# Patient Record
Sex: FEMALE | Race: WHITE | Employment: FULL TIME | URBAN - METROPOLITAN AREA
[De-identification: names, ages, dates, MRNs, and addresses within clinical notes are randomized per-mention and may not be internally consistent; named-entity substitution may affect disease eponyms.]

---

## 2020-08-11 ENCOUNTER — APPOINTMENT (OUTPATIENT)
Dept: GENERAL RADIOLOGY | Age: 55
End: 2020-08-11

## 2020-08-11 ENCOUNTER — HOSPITAL ENCOUNTER (EMERGENCY)
Age: 55
Discharge: HOME | End: 2020-08-11
Attending: EMERGENCY MEDICINE

## 2020-08-11 VITALS
HEIGHT: 68 IN | BODY MASS INDEX: 33.01 KG/M2 | RESPIRATION RATE: 18 BRPM | WEIGHT: 217.8 LBS | HEART RATE: 99 BPM | OXYGEN SATURATION: 96 % | SYSTOLIC BLOOD PRESSURE: 152 MMHG | TEMPERATURE: 98.1 F | DIASTOLIC BLOOD PRESSURE: 100 MMHG

## 2020-08-11 LAB — D DIMER: <200 NG/ML DDU (ref 0–229)

## 2020-08-11 PROCEDURE — 96372 THER/PROPH/DIAG INJ SC/IM: CPT

## 2020-08-11 PROCEDURE — 6360000002 HC RX W HCPCS: Performed by: EMERGENCY MEDICINE

## 2020-08-11 PROCEDURE — 85379 FIBRIN DEGRADATION QUANT: CPT

## 2020-08-11 PROCEDURE — 73562 X-RAY EXAM OF KNEE 3: CPT

## 2020-08-11 PROCEDURE — 99283 EMERGENCY DEPT VISIT LOW MDM: CPT

## 2020-08-11 RX ORDER — IBUPROFEN 200 MG
200 TABLET ORAL EVERY 6 HOURS PRN
COMMUNITY

## 2020-08-11 RX ORDER — CYCLOBENZAPRINE HCL 5 MG
5 TABLET ORAL 3 TIMES DAILY PRN
COMMUNITY

## 2020-08-11 RX ORDER — OXYCODONE HYDROCHLORIDE 15 MG/1
15 TABLET ORAL EVERY 4 HOURS PRN
COMMUNITY

## 2020-08-11 RX ORDER — KETOROLAC TROMETHAMINE 30 MG/ML
30 INJECTION, SOLUTION INTRAMUSCULAR; INTRAVENOUS ONCE
Status: COMPLETED | OUTPATIENT
Start: 2020-08-11 | End: 2020-08-11

## 2020-08-11 RX ORDER — VENLAFAXINE HYDROCHLORIDE 150 MG/1
150 TABLET, EXTENDED RELEASE ORAL
COMMUNITY

## 2020-08-11 RX ORDER — ASPIRIN 81 MG
1 TABLET,CHEWABLE ORAL
Qty: 56.6 G | Refills: 0 | Status: SHIPPED | OUTPATIENT
Start: 2020-08-11

## 2020-08-11 RX ORDER — METOPROLOL SUCCINATE 50 MG/1
50 TABLET, EXTENDED RELEASE ORAL DAILY
COMMUNITY

## 2020-08-11 RX ORDER — AMLODIPINE BESYLATE 10 MG/1
10 TABLET ORAL DAILY
COMMUNITY

## 2020-08-11 RX ADMIN — KETOROLAC TROMETHAMINE 30 MG: 30 INJECTION, SOLUTION INTRAMUSCULAR at 19:36

## 2020-08-11 SDOH — HEALTH STABILITY: MENTAL HEALTH: HOW OFTEN DO YOU HAVE A DRINK CONTAINING ALCOHOL?: NEVER

## 2020-08-11 ASSESSMENT — PAIN DESCRIPTION - PAIN TYPE: TYPE: ACUTE PAIN

## 2020-08-11 ASSESSMENT — PAIN SCALES - GENERAL
PAINLEVEL_OUTOF10: 2
PAINLEVEL_OUTOF10: 10

## 2020-08-11 ASSESSMENT — PAIN DESCRIPTION - LOCATION: LOCATION: LEG

## 2020-08-11 ASSESSMENT — ENCOUNTER SYMPTOMS: SHORTNESS OF BREATH: 0

## 2020-08-11 ASSESSMENT — PAIN DESCRIPTION - ORIENTATION: ORIENTATION: RIGHT

## 2020-08-11 NOTE — ED NOTES
Pt dc/d with instructions and rx in stable condition, ambulatory to [facility], home per ride.       Franchesca Lee RN  08/11/20 8583

## 2020-08-11 NOTE — ED PROVIDER NOTES
[address] [facility]  EMERGENCYDEPARTMENT ENCOUNTER      Pt Name: Emily Griffith  MRN: [id number]  [place] 1965  Date of evaluation: 8/11/2020  Thelma Velasquez MD    [address] [place] [address]       Chief Complaint   Patient presents with    Leg Pain         HISTORY OF PRESENT ILLNESS   (Location/Symptom, Timing/Onset,Context/Setting, Quality, Duration, Modifying Factors, Severity)  Note limiting factors. Emily Griffith is a 54 y.o. female who presents to the emergency department for right leg pain x 2 weeks. Scribes sharp shooting pains that start from the thigh going down all the way down to the foot. 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 Denies falls, trauma, injuries that she's aware of. States she had similar episode (unknown which side), and had a 'hot cream' and two days later pain resolved. And yesterday was walking up the steps and felt her knee 'twist and heard a pop' and since then has been more difficult to bear weight and ambulate. HPI    Nursing Notes were reviewed. REVIEW OF SYSTEMS    (2-9 systems for level 4, 10 or more for level 5)     Review of Systems   Respiratory: Negative for shortness of breath. Musculoskeletal: Positive for arthralgias, gait problem and joint swelling. Skin: Negative for rash. Neurological: Negative for weakness and numbness. Except as noted above the remainder of the review of systems was reviewedand negative. PAST MEDICAL HISTORY     Past Medical History:   Diagnosis Date    Degenerative disc disease, lumbar     Hyperlipidemia     Hypertension     Spinal stenosis          SURGICAL HISTORY     History reviewed. No pertinent surgical history.       CURRENT MEDICATIONS       Previous Medications    AMLODIPINE (NORVASC) 10 MG TABLET    Take 10 mg by mouth daily ATORVASTATIN CALCIUM PO    Take by mouth    CYCLOBENZAPRINE (FLEXERIL) 5 MG TABLET    Take 5 mg by mouth 3 times daily as needed for Muscle spasms    IBUPROFEN (ADVIL;MOTRIN) 200 MG TABLET    Take 200 mg by mouth every 6 hours as needed for Pain    METOPROLOL SUCCINATE (TOPROL XL) 50 MG EXTENDED RELEASE TABLET    Take 50 mg by mouth daily    OXYCODONE (OXY-IR) 15 MG IMMEDIATE RELEASE TABLET    Take 15 mg by mouth every 4 hours as needed for Pain. VENLAFAXINE 150 MG EXTENDED RELEASE TABLET    Take 150 mg by mouth daily (with breakfast)       ALLERGIES     Patient has no known allergies. FAMILY HISTORY     History reviewed. No pertinent family history.        SOCIAL HISTORY       Social History     Socioeconomic History    Marital status: Single     Spouse name: None    Number of children: None    Years of education: None    Highest education level: None   Occupational History    None   Social Needs    Financial resource strain: None    Food insecurity     Worry: None     Inability: None    Transportation needs     Medical: None     Non-medical: None   Tobacco Use    Smoking status: Current Every Day Smoker     Packs/day: 0.75     Types: Cigarettes    Smokeless tobacco: Never Used   Substance and Sexual Activity    Alcohol use: Never     Frequency: Never    Drug use: None    Sexual activity: None   Lifestyle    Physical activity     Days per week: None     Minutes per session: None    Stress: None   Relationships    Social connections     Talks on phone: None     Gets together: None     Attends Restorationist service: None     Active member of club or organization: None     Attends meetings of clubs or organizations: None     Relationship status: None    Intimate partner violence     Fear of current or ex partner: None     Emotionally abused: None     Physically abused: None     Forced sexual activity: None   Other Topics Concern    None   Social History Narrative    None       SCREENINGS PHYSICAL EXAM    (up to 7 for level 4, 8 ormore for level 5)     ED Triage Vitals [08/11/20 1824]   BP Temp Temp Source Pulse Resp SpO2 Height Weight   (!) 150/101 98.1 °F (36.7 °C) Oral 124 18 97 % 5' 8\" (1.727 m) 217 lb 12.8 oz (98.8 kg)       Physical Exam  Vitals signs and nursing note reviewed. Constitutional:       General: She is not in acute distress. Appearance: She is well-developed. She is not ill-appearing, toxic-appearing or diaphoretic. Comments: Well-appearing, nontoxic, not in acute distress. Answers questions in full sentences and following verbal commands appropriately   HENT:      Head: Normocephalic and atraumatic. Eyes:      General:         Right eye: No discharge. Left eye: No discharge. Conjunctiva/sclera: Conjunctivae normal.   Neck:      Musculoskeletal: Normal range of motion and neck supple. Cardiovascular:      Pulses:           Dorsalis pedis pulses are 2+ on the right side and 2+ on the left side. Pulmonary:      Effort: Pulmonary effort is normal. No respiratory distress. Musculoskeletal:      Right knee: She exhibits decreased range of motion and swelling. Tenderness found. Medial joint line tenderness noted. Comments: Symmetric bilateral lower extremity. +Maycol's sign, tenderness to right calf, without erythema or any other overlying skin changes   Skin:     Coloration: Skin is not pale. Neurological:      Mental Status: She is alert and oriented to person, place, and time. Psychiatric:         Behavior: Behavior normal. Behavior is cooperative.          DIAGNOSTIC RESULTS     EKG: All EKG's are interpreted by the Emergency Department Physicianwho either signs or Co-signs this chart in the absence of a cardiologist.      RADIOLOGY:   Non-plain film images such as CT, Ultrasound and MRI are read by the radiologist. Plain radiographic images are visualized and preliminarily interpreted by the emergency physician with the below findings:      Interpretation per the Radiologist below, if available at the time of this note:    XR KNEE RIGHT (3 VIEWS)    (Results Pending)         ED BEDSIDE ULTRASOUND:   Performed by ED Physician - none    LABS:  Labs Reviewed   D-DIMER, QUANTITATIVE       All other labs were within normal range ornot returned as of this dictation. EMERGENCY DEPARTMENT COURSE and DIFFERENTIAL DIAGNOSIS/MDM:   Vitals:    Vitals:    08/11/20 1824   BP: (!) 150/101   Pulse: 124   Resp: 18   Temp: 98.1 °F (36.7 °C)   TempSrc: Oral   SpO2: 97%   Weight: 217 lb 12.8 oz (98.8 kg)   Height: 5' 8\" (1.727 m)         MDM    ED COURSE/MDM    -Shahriar Yi is a 54 y.o. female with no significant medical history presents ED for right leg pain. Patient states that it started 2 weeks ago described as sharp shooting pains from right upper thigh that radiates down to her toes. Denies falls, traumas, accidents or any other known injury. States she felt like it was starting to improve several days later however pain returned and today was walking down the stairs when she felt her knee twist and heard a pop. Patient states when she is sitting does not cause any pain however when she ambulates or bears weight, reports tenderness to the calf and goes up her mid thigh.  -On evaluation, 2+ DP pulses bilaterally, skin dry and appropriately warm. Bilateral lower extremity symmetric in size. Mild swelling to right knee, posterior and anterior drawer test, Lachman and Ryan, valgus and varus stress all negative.  -Upon handoff pending X-ray of right knee, however there is concern for DVT given complaints of calf tenderness. Likely will need future DVT scan. Disposition as deemed appropriate by Dr. Padilla Barrera.        REASSESSMENT      Well appearing, non toxic, alert, oriented speaking in full sentences and hemodynamically stable upon discharge      CRITICAL CARE TIME   Total Critical Care time was 0 minutes, excluding separately reportableprocedures. There was a high probability of clinicallysignificant/life threatening deterioration in the patient's condition which required my urgent intervention. CONSULTS:  None    PROCEDURES:  Unless otherwise noted below, none     Procedures    FINAL IMPRESSION      1. Calf tenderness    2. Acute pain of right knee          DISPOSITION/PLAN   DISPOSITION        PATIENT REFERREDTO:  No follow-up provider specified.     DISCHARGE MEDICATIONS:  New Prescriptions    No medications on file          (Please note that portions of this note were completed with a voice recognition program.  Efforts were made to edit the dictations but occasionally wordsare mis-transcribed.)    Alicia Jaimes MD (electronically signed)  Attending Emergency Physician            Alicia Jaimes MD  08/11/20 2317

## 2021-11-13 ENCOUNTER — HOSPITAL ENCOUNTER (EMERGENCY)
Age: 56
Discharge: HOME | End: 2021-11-13
Attending: EMERGENCY MEDICINE
Payer: OTHER GOVERNMENT

## 2021-11-13 ENCOUNTER — APPOINTMENT (OUTPATIENT)
Dept: CT IMAGING | Age: 56
End: 2021-11-13
Payer: OTHER GOVERNMENT

## 2021-11-13 VITALS
HEIGHT: 68 IN | HEART RATE: 86 BPM | WEIGHT: 225.2 LBS | BODY MASS INDEX: 34.13 KG/M2 | OXYGEN SATURATION: 98 % | SYSTOLIC BLOOD PRESSURE: 147 MMHG | DIASTOLIC BLOOD PRESSURE: 85 MMHG | TEMPERATURE: 97.9 F | RESPIRATION RATE: 20 BRPM

## 2021-11-13 DIAGNOSIS — Z20.822 ENCOUNTER FOR LABORATORY TESTING FOR COVID-19 VIRUS: ICD-10-CM

## 2021-11-13 DIAGNOSIS — R19.7 DIARRHEA, UNSPECIFIED TYPE: ICD-10-CM

## 2021-11-13 DIAGNOSIS — J06.9 VIRAL URI WITH COUGH: Primary | ICD-10-CM

## 2021-11-13 PROCEDURE — U0003 INFECTIOUS AGENT DETECTION BY NUCLEIC ACID (DNA OR RNA); SEVERE ACUTE RESPIRATORY SYNDROME CORONAVIRUS 2 (SARS-COV-2) (CORONAVIRUS DISEASE [COVID-19]), AMPLIFIED PROBE TECHNIQUE, MAKING USE OF HIGH THROUGHPUT TECHNOLOGIES AS DESCRIBED BY CMS-2020-01-R: HCPCS

## 2021-11-13 PROCEDURE — 99282 EMERGENCY DEPT VISIT SF MDM: CPT

## 2021-11-13 PROCEDURE — U0005 INFEC AGEN DETEC AMPLI PROBE: HCPCS

## 2021-11-13 RX ORDER — BENZONATATE 100 MG/1
100-200 CAPSULE ORAL 3 TIMES DAILY PRN
Qty: 30 CAPSULE | Refills: 1 | Status: SHIPPED | OUTPATIENT
Start: 2021-11-13 | End: 2021-11-20

## 2021-11-13 NOTE — ED TRIAGE NOTES
Loss of taste and smell since Tuesday. Mild cough. Nasal congestion. Had 2 days of diarrhea and lower abdominal cramping beginning Tuesday as well. Has subsided now.

## 2021-11-13 NOTE — ED PROVIDER NOTES
TRIAGE CHIEF COMPLAINT:   Chief Complaint   Patient presents with    Concern For COVID-19         HPI: Vaelria Chau is a 64 y.o. female who presents to the Emergency Department with complaint of onset 2 days ago of diarrhea. Since then she has had decreased sense of taste and smell. Denies fever or chills. She has a slight congested cough. Denies any myalgias. No chest pain or shortness of breath. Denies vomiting. Her diarrhea is better. Patient denies any definite COVID-19 exposure. Her mother has cough and congestion but has recently tested negative for Covid. Patient is requesting COVID-19 testing. She has a history of hypertension and states she is compliant on her medications. REVIEW OF SYSTEMS:  6 systems reviewed. Pertinent positives per HPI. Otherwise noted to be negative. Nursing notes reviewed and agree with above. Past medical/surgical history reviewed. MEDICATIONS   Patient's Medications   New Prescriptions    No medications on file   Previous Medications    AMLODIPINE (NORVASC) 10 MG TABLET    Take 10 mg by mouth daily    ATORVASTATIN CALCIUM PO    Take by mouth    CAPSAICIN ([employer] CAPSAICIN HP) 0.1 % CREA    Apply 1 g topically every 4-6 hours as needed (leg pain)    CYCLOBENZAPRINE (FLEXERIL) 5 MG TABLET    Take 5 mg by mouth 3 times daily as needed for Muscle spasms    IBUPROFEN (ADVIL;MOTRIN) 200 MG TABLET    Take 200 mg by mouth every 6 hours as needed for Pain    METOPROLOL SUCCINATE (TOPROL XL) 50 MG EXTENDED RELEASE TABLET    Take 50 mg by mouth daily    OXYCODONE (OXY-IR) 15 MG IMMEDIATE RELEASE TABLET    Take 15 mg by mouth every 4 hours as needed for Pain.     VENLAFAXINE 150 MG EXTENDED RELEASE TABLET    Take 150 mg by mouth daily (with breakfast)   Modified Medications    No medications on file   Discontinued Medications    No medications on file         ALLERGIES No Known Allergies      BP (!) 147/85   Pulse 86   Temp 97.9 °F (36.6 °C) (Oral)   Resp 20   Ht 5' 8\" (1.727 m)   Wt 225 lb 3.2 oz (102.2 kg)   SpO2 98%   BMI 34.24 kg/m²   General:  No acute distress. Non toxic appearance. Patient has chronic hoarseness of her voice which she states is baseline. Head:   Normocephalic and atraumatic  Eyes:   Conjunctiva clear, NICOLAS, EOM's intact. Sclera anicteric. ENT:   Mucous membranes moist.  TMs are normal.  Nose and oropharynx are clear. Neck:   Supple. No adenopathy or jugular venous distension  Lungs/Chest:  No respiratory distress occasional congested cough noted. No rales heard. CVS:   Regular rate and rhythm  Abdomen:  Nontender  Extremities:  Full range of motion  Skin:   No rashes or lesions to exposed skin  Back:   Nontender  Neuro:  Alert and OX3. Speech clear and appropriate. No upper/lower extremity weakness. Normal sensation in all extremities. No facial asymmetry or weakness. Gait normal.  Psych:   Affect normal. Mood normal        RADIOLOGY:      LAB      ED COURSE / MDM:  51-year-old female with onset 2 days ago of diarrhea followed by decreased sense of taste and smell and slight cough. No abdominal pain or vomiting. No fever or chills. Denies chest pain or shortness of breath. I see no clinical evidence for otitis media, strep throat, acute bacterial sinusitis or pneumonia. COVID-19 testing was sent and results are pending. She was given COVID-19 discharge instructions. I recommended ibuprofen if needed for pain along with increase fluids. She was given a prescription for Tessalon to take if needed for cough. Recommended over-the-counter Imodium if she should needed for diarrhea. I discussed with Chanda Ruiz the results of the evaluation in the Emergency Department, diagnosis, care, prognosis and the importance of follow-up. The patient is stable for discharge. The patient and/or family are in agreement with the plan and all questions have been answered.   Specific discharge instructions were explained, including reasons to

## 2021-11-13 NOTE — Clinical Note
Karina Urbina was seen and treated in our emergency department on 11/13/2021. She may return to work on 11/16/2021. If you have any questions or concerns, please don't hesitate to call.       Milly Christianson MD

## 2021-11-14 LAB — SARS-COV-2: DETECTED

## 2021-11-15 ENCOUNTER — CARE COORDINATION (OUTPATIENT)
Dept: CASE MANAGEMENT | Age: 56
End: 2021-11-15

## 2021-11-15 NOTE — CARE COORDINATION
Attempted to contact patient for ED f/u call  Message states the number you have dialed is no longer in service or the number has been disconnected  No further outreach scheduled with this ACM; episode of care resolved

## 2022-01-20 ENCOUNTER — HOSPITAL ENCOUNTER (EMERGENCY)
Age: 57
Discharge: HOME | End: 2022-01-20
Attending: EMERGENCY MEDICINE

## 2022-01-20 VITALS
TEMPERATURE: 97.4 F | BODY MASS INDEX: 35.06 KG/M2 | HEART RATE: 85 BPM | WEIGHT: 231.31 LBS | HEIGHT: 68 IN | OXYGEN SATURATION: 100 % | RESPIRATION RATE: 16 BRPM

## 2022-01-20 DIAGNOSIS — S61.011A LACERATION OF RIGHT THUMB WITHOUT FOREIGN BODY WITHOUT DAMAGE TO NAIL, INITIAL ENCOUNTER: Primary | ICD-10-CM

## 2022-01-20 PROCEDURE — 90715 TDAP VACCINE 7 YRS/> IM: CPT | Performed by: EMERGENCY MEDICINE

## 2022-01-20 PROCEDURE — 90471 IMMUNIZATION ADMIN: CPT | Performed by: EMERGENCY MEDICINE

## 2022-01-20 PROCEDURE — 12001 RPR S/N/AX/GEN/TRNK 2.5CM/<: CPT

## 2022-01-20 PROCEDURE — 6360000002 HC RX W HCPCS: Performed by: EMERGENCY MEDICINE

## 2022-01-20 PROCEDURE — 99283 EMERGENCY DEPT VISIT LOW MDM: CPT

## 2022-01-20 PROCEDURE — 6370000000 HC RX 637 (ALT 250 FOR IP): Performed by: EMERGENCY MEDICINE

## 2022-01-20 RX ORDER — CEPHALEXIN 500 MG/1
500 CAPSULE ORAL ONCE
Status: COMPLETED | OUTPATIENT
Start: 2022-01-20 | End: 2022-01-20

## 2022-01-20 RX ORDER — CEPHALEXIN 500 MG/1
500 CAPSULE ORAL 2 TIMES DAILY
Qty: 6 CAPSULE | Refills: 0 | Status: SHIPPED | OUTPATIENT
Start: 2022-01-20 | End: 2022-01-23

## 2022-01-20 RX ADMIN — TETANUS TOXOID, REDUCED DIPHTHERIA TOXOID AND ACELLULAR PERTUSSIS VACCINE, ADSORBED 0.5 ML: 5; 2.5; 8; 8; 2.5 SUSPENSION INTRAMUSCULAR at 12:52

## 2022-01-20 RX ADMIN — CEPHALEXIN 500 MG: 500 CAPSULE ORAL at 12:53

## 2022-01-20 ASSESSMENT — ENCOUNTER SYMPTOMS
COUGH: 0
SHORTNESS OF BREATH: 0
WHEEZING: 0
PHOTOPHOBIA: 0
RHINORRHEA: 0
DIARRHEA: 0
ABDOMINAL DISTENTION: 0
NAUSEA: 0
VOMITING: 0
BACK PAIN: 0

## 2022-01-20 NOTE — ED NOTES
Pt states that she was at work using the  and she sliced her thumb on right hand and pt currently soaking hand in saline and hibiclense and denies pain and states that her tetnus is greater than 5 years.       Paris Magallanes RN  01/20/22 5838

## 2022-01-20 NOTE — ED PROVIDER NOTES
Emergency Department Provider Note  Location: [address] [place] [address]  1/20/2022     Patient Identification  Willy Nichols is a 64 y.o. female    Chief Complaint  Laceration (right thumb from )          HPI  (History provided by patient)  Patient is a 49-year-old female who presents for occupational injury laceration to the right thumb pad of the thumb distal phalanx after cutting it on a . She had been cutting meat and sliced her finger. She denies blood thinners or anticoagulants. Unknown last tetanus. Hemostatic at time of arrival.  No other injuries reported. Very mild achy pain no exacerbating relieving factors. No numbness and able to normally range the thumb. I have reviewed the following nursing documentation:  Allergies: No Known Allergies    Past medical history:  has a past medical history of Degenerative disc disease, lumbar, Hyperlipidemia, Hypertension, and Spinal stenosis. Past surgical history:  has no past surgical history on file. Home medications:   Prior to Admission medications    Medication Sig Start Date End Date Taking? Authorizing Provider   cephALEXin (KEFLEX) 500 MG capsule Take 1 capsule by mouth 2 times daily for 3 days 1/20/22 1/23/22 Yes Ivan Rumsey, MD   ibuprofen (ADVIL;MOTRIN) 200 MG tablet Take 200 mg by mouth every 6 hours as needed for Pain    Historical Provider, MD   oxyCODONE (OXY-IR) 15 MG immediate release tablet Take 15 mg by mouth every 4 hours as needed for Pain.     Historical Provider, MD   cyclobenzaprine (FLEXERIL) 5 MG tablet Take 5 mg by mouth 3 times daily as needed for Muscle spasms    Historical Provider, MD   amLODIPine (NORVASC) 10 MG tablet Take 10 mg by mouth daily    Historical Provider, MD   metoprolol succinate (TOPROL XL) 50 MG extended release tablet Take 50 mg by mouth daily    Historical Provider, MD   venlafaxine 150 MG extended release tablet Take 150 mg by mouth daily (with breakfast) Historical Provider, MD   ATORVASTATIN CALCIUM PO Take by mouth    Historical Provider, MD   Capsaicin ([employer]S CAPSAICIN HP) 0.1 % CREA Apply 1 g topically every 4-6 hours as needed (leg pain) 8/11/20   Dao Moss MD       Social history:  reports that she has been smoking cigarettes. She has been smoking about 0.75 packs per day. She has never used smokeless tobacco. She reports that she does not drink alcohol. Family history:  History reviewed. No pertinent family history. ROS  Review of Systems   Constitutional: Negative for chills and fever. HENT: Negative for congestion and rhinorrhea. Eyes: Negative for photophobia and visual disturbance. Respiratory: Negative for cough, shortness of breath and wheezing. Cardiovascular: Negative for chest pain and palpitations. Gastrointestinal: Negative for abdominal distention, diarrhea, nausea and vomiting. Genitourinary: Negative for dysuria and hematuria. Musculoskeletal: Negative for back pain and neck pain. Skin: Positive for wound. Negative for rash. Neurological: Negative for syncope and weakness. Psychiatric/Behavioral: Negative for agitation and confusion. Exam  ED Triage Vitals [01/20/22 1150]   BP Temp Temp Source Pulse Resp SpO2 Height Weight   -- 97.4 °F (36.3 °C) Oral 85 16 100 % 5' 8\" (1.727 m) 231 lb 5 oz (104.9 kg)       Physical Exam  Vitals and nursing note reviewed. Constitutional:       General: She is not in acute distress. Appearance: She is well-developed. HENT:      Head: Normocephalic and atraumatic. Nose: Nose normal. No congestion. Cardiovascular:      Rate and Rhythm: Normal rate and regular rhythm. Heart sounds: No murmur heard. Pulmonary:      Effort: Pulmonary effort is normal.      Breath sounds: Normal breath sounds. Musculoskeletal:         General: No deformity. Normal range of motion. Cervical back: Normal range of motion and neck supple.       Comments: 1 cm linear laceration on the pad of the thumb. It is well approximated does not gape open against resistance. He was static. Brisk capillary refill neurovascular intact distally. Normal range of motion of the IP joint including against resistance. Skin:     General: Skin is warm. Findings: No rash. Neurological:      Mental Status: She is alert and oriented to person, place, and time. Motor: No abnormal muscle tone. Coordination: Coordination normal.   Psychiatric:         Mood and Affect: Mood normal.         Behavior: Behavior normal.             ED Course    ED Medication Orders (From admission, onward)    Start Ordered     Status Ordering Provider    01/20/22 1245 01/20/22 1235  Tetanus-Diphth-Acell Pertussis (BOOSTRIX) injection 0.5 mL  ONCE         Last MAR action: Given - by Laura Arredondo on 01/20/22 at [address], [address]    01/20/22 1245 01/20/22 1235  cephALEXin (KEFLEX) capsule 500 mg  ONCE        Question:  Antimicrobial Indications  Answer:  Surgical Prophylaxis    Last MAR action: Given - by Laura Arredondo on 01/20/22 at [address], [id number] [address]          Radiology  No results found. Labs  No results found for this visit on 01/20/22. Lac Repair    Date/Time: 1/20/2022 6:16 PM  Performed by: Georgia Alcazar MD  Authorized by: Georgia Alcazar MD     Consent:     Consent obtained:  Verbal    Consent given by:  Patient    Risks discussed:  Infection, need for additional repair, poor cosmetic result, pain, retained foreign body, tendon damage, vascular damage, poor wound healing and nerve damage    Alternatives discussed:  No treatment  Anesthesia (see MAR for exact dosages):      Anesthesia method:  None  Laceration details:     Location:  Finger    Finger location:  R thumb    Length (cm):  1  Repair type:     Repair type:  Simple  Treatment:     Area cleansed with:  Soap and water    Amount of cleaning:  Standard  Skin repair:     Repair method:  Tissue adhesive  Approximation:     Approximation:  Close  Post-procedure details:     Dressing:  Non-adherent dressing    Patient tolerance of procedure: Tolerated well, no immediate complications          MDM  Patient seen and evaluated. Relevant records reviewed. 66-year-old female who presents with right thumb laceration after cutting it on a  while cutting lunch meat at work today. Well-appearing on exam reassuring vitals. Hemostatic 1 cm laceration which is well approximated. Neurovascular intact. Her tetanus was updated. Low concern for retained foreign body. Given the mechanism of the injury I am prescribing her prophylactic antibiotic. Primary repair with surgical adhesive. I discussed appropriate wound care return precautions and follow-up. Patient agreeable to plan expressed understanding of plan. Clinical Impression:  1. Laceration of right thumb without foreign body without damage to nail, initial encounter          Disposition:  Discharge to home in good condition. Pulse 85, temperature 97.4 °F (36.3 °C), temperature source Oral, resp. rate 16, height 5' 8\" (1.727 m), weight 231 lb 5 oz (104.9 kg), SpO2 100 %. Patient was given scripts for the following medications. I counseled patient how to take these medications. Discharge Medication List as of 1/20/2022  1:19 PM      START taking these medications    Details   cephALEXin (KEFLEX) 500 MG capsule Take 1 capsule by mouth 2 times daily for 3 days, Disp-6 capsule, R-0Normal             Disposition referral (if applicable):  No follow-up provider specified. Total critical care time is 0 minutes, which excludes separately billable procedures and updating family. Time spent is specifically for management of the presenting complaint and symptoms initially, direct bedside care, reevaluation, review of records, and consultation.   There was a high probability of clinically significant life-threatening deterioration in the patient's condition, which required my urgent intervention. This chart was generated in part by using Dragon Dictation system and may contain errors related to that system including errors in grammar, punctuation, and spelling, as well as words and phrases that may be inappropriate. If there are any questions or concerns please feel free to contact the dictating provider for clarification.      Wilian Ashley MD  [address] Al[address] MD  01/20/22 7532

## 2022-09-07 ENCOUNTER — TELEPHONE (OUTPATIENT)
Dept: BARIATRICS/WEIGHT MGMT | Age: 57
End: 2022-09-07

## 2023-07-22 ENCOUNTER — HOSPITAL ENCOUNTER (EMERGENCY)
Age: 58
Discharge: HOME | End: 2023-07-22
Attending: STUDENT IN AN ORGANIZED HEALTH CARE EDUCATION/TRAINING PROGRAM
Payer: COMMERCIAL

## 2023-07-22 VITALS
RESPIRATION RATE: 18 BRPM | TEMPERATURE: 98.8 F | HEIGHT: 68 IN | HEART RATE: 85 BPM | SYSTOLIC BLOOD PRESSURE: 165 MMHG | BODY MASS INDEX: 32.05 KG/M2 | WEIGHT: 211.44 LBS | OXYGEN SATURATION: 96 % | DIASTOLIC BLOOD PRESSURE: 84 MMHG

## 2023-07-22 DIAGNOSIS — N30.01 ACUTE CYSTITIS WITH HEMATURIA: Primary | ICD-10-CM

## 2023-07-22 LAB
BACTERIA URNS QL MICRO: ABNORMAL /HPF
BILIRUB UR QL STRIP.AUTO: ABNORMAL
CLARITY UR: CLEAR
COLOR UR: ABNORMAL
EPI CELLS #/AREA URNS HPF: ABNORMAL /HPF (ref 0–5)
GLUCOSE UR STRIP.AUTO-MCNC: ABNORMAL MG/DL
HGB UR QL STRIP.AUTO: ABNORMAL
KETONES UR STRIP.AUTO-MCNC: ABNORMAL MG/DL
LEUKOCYTE ESTERASE UR QL STRIP.AUTO: ABNORMAL
MUCOUS THREADS #/AREA URNS LPF: ABNORMAL /LPF
NITRITE UR QL STRIP.AUTO: ABNORMAL
PH UR STRIP.AUTO: ABNORMAL [PH] (ref 5–8)
PROT UR STRIP.AUTO-MCNC: ABNORMAL MG/DL
RBC #/AREA URNS HPF: ABNORMAL /HPF (ref 0–4)
SP GR UR STRIP.AUTO: 1.02 (ref 1–1.03)
UA DIPSTICK W REFLEX MICRO PNL UR: YES
URN SPEC COLLECT METH UR: ABNORMAL
UROBILINOGEN UR STRIP-ACNC: ABNORMAL E.U./DL
WBC #/AREA URNS HPF: ABNORMAL /HPF (ref 0–5)

## 2023-07-22 PROCEDURE — 81001 URINALYSIS AUTO W/SCOPE: CPT

## 2023-07-22 PROCEDURE — 6370000000 HC RX 637 (ALT 250 FOR IP): Performed by: STUDENT IN AN ORGANIZED HEALTH CARE EDUCATION/TRAINING PROGRAM

## 2023-07-22 PROCEDURE — 99283 EMERGENCY DEPT VISIT LOW MDM: CPT

## 2023-07-22 PROCEDURE — 87086 URINE CULTURE/COLONY COUNT: CPT

## 2023-07-22 PROCEDURE — 87077 CULTURE AEROBIC IDENTIFY: CPT

## 2023-07-22 PROCEDURE — 87186 SC STD MICRODIL/AGAR DIL: CPT

## 2023-07-22 RX ORDER — CEPHALEXIN 500 MG/1
500 CAPSULE ORAL ONCE
Status: COMPLETED | OUTPATIENT
Start: 2023-07-22 | End: 2023-07-22

## 2023-07-22 RX ORDER — CEPHALEXIN 500 MG/1
500 CAPSULE ORAL 2 TIMES DAILY
Qty: 10 CAPSULE | Refills: 0 | Status: SHIPPED | OUTPATIENT
Start: 2023-07-22 | End: 2023-07-27

## 2023-07-22 RX ADMIN — CEPHALEXIN 500 MG: 500 CAPSULE ORAL at 18:00

## 2023-07-22 ASSESSMENT — PAIN SCALES - GENERAL: PAINLEVEL_OUTOF10: 5

## 2023-07-22 ASSESSMENT — LIFESTYLE VARIABLES
HOW MANY STANDARD DRINKS CONTAINING ALCOHOL DO YOU HAVE ON A TYPICAL DAY: PATIENT DOES NOT DRINK
HOW OFTEN DO YOU HAVE A DRINK CONTAINING ALCOHOL: NEVER

## 2023-07-22 ASSESSMENT — PAIN - FUNCTIONAL ASSESSMENT: PAIN_FUNCTIONAL_ASSESSMENT: 0-10

## 2023-07-23 LAB
BACTERIA UR CULT: ABNORMAL
ORGANISM: ABNORMAL

## 2023-07-24 LAB
BACTERIA UR CULT: ABNORMAL
ORGANISM: ABNORMAL

## 2024-02-21 ENCOUNTER — HOSPITAL ENCOUNTER (EMERGENCY)
Age: 59
Discharge: HOME | End: 2024-02-21
Attending: EMERGENCY MEDICINE
Payer: COMMERCIAL

## 2024-02-21 ENCOUNTER — APPOINTMENT (OUTPATIENT)
Dept: GENERAL RADIOLOGY | Age: 59
End: 2024-02-21
Payer: COMMERCIAL

## 2024-02-21 VITALS
BODY MASS INDEX: 33.19 KG/M2 | HEIGHT: 68 IN | WEIGHT: 219 LBS | TEMPERATURE: 97.7 F | OXYGEN SATURATION: 100 % | RESPIRATION RATE: 18 BRPM | HEART RATE: 72 BPM | DIASTOLIC BLOOD PRESSURE: 90 MMHG | SYSTOLIC BLOOD PRESSURE: 143 MMHG

## 2024-02-21 DIAGNOSIS — J06.9 VIRAL URI: Primary | ICD-10-CM

## 2024-02-21 DIAGNOSIS — R07.9 CHEST PAIN, UNSPECIFIED TYPE: ICD-10-CM

## 2024-02-21 LAB
ANION GAP SERPL CALCULATED.3IONS-SCNC: 11 MMOL/L (ref 3–16)
BASOPHILS # BLD: 0.1 K/UL (ref 0–0.2)
BASOPHILS NFR BLD: 0.9 %
BUN SERPL-MCNC: 23 MG/DL (ref 7–20)
CALCIUM SERPL-MCNC: 10 MG/DL (ref 8.3–10.6)
CHLORIDE SERPL-SCNC: 104 MMOL/L (ref 99–110)
CO2 SERPL-SCNC: 26 MMOL/L (ref 21–32)
CREAT SERPL-MCNC: 1 MG/DL (ref 0.6–1.1)
DEPRECATED RDW RBC AUTO: 13.9 % (ref 12.4–15.4)
EKG ATRIAL RATE: 65 BPM
EKG DIAGNOSIS: NORMAL
EKG P AXIS: 54 DEGREES
EKG P-R INTERVAL: 184 MS
EKG Q-T INTERVAL: 414 MS
EKG QRS DURATION: 86 MS
EKG QTC CALCULATION (BAZETT): 430 MS
EKG R AXIS: 6 DEGREES
EKG T AXIS: 44 DEGREES
EKG VENTRICULAR RATE: 65 BPM
EOSINOPHIL # BLD: 0.2 K/UL (ref 0–0.6)
EOSINOPHIL NFR BLD: 2 %
FLUAV RNA UPPER RESP QL NAA+PROBE: NEGATIVE
FLUBV AG NPH QL: NEGATIVE
GFR SERPLBLD CREATININE-BSD FMLA CKD-EPI: >60 ML/MIN/{1.73_M2}
GLUCOSE SERPL-MCNC: 109 MG/DL (ref 70–99)
HCT VFR BLD AUTO: 50.6 % (ref 36–48)
HGB BLD-MCNC: 17.3 G/DL (ref 12–16)
LYMPHOCYTES # BLD: 2.5 K/UL (ref 1–5.1)
LYMPHOCYTES NFR BLD: 29.3 %
MCH RBC QN AUTO: 32.5 PG (ref 26–34)
MCHC RBC AUTO-ENTMCNC: 34.2 G/DL (ref 31–36)
MCV RBC AUTO: 95 FL (ref 80–100)
MONOCYTES # BLD: 0.6 K/UL (ref 0–1.3)
MONOCYTES NFR BLD: 7.1 %
NEUTROPHILS # BLD: 5.1 K/UL (ref 1.7–7.7)
NEUTROPHILS NFR BLD: 60.7 %
PLATELET # BLD AUTO: 169 K/UL (ref 135–450)
PMV BLD AUTO: 9.2 FL (ref 5–10.5)
POTASSIUM SERPL-SCNC: 4 MMOL/L (ref 3.5–5.1)
RBC # BLD AUTO: 5.33 M/UL (ref 4–5.2)
SARS-COV-2 RDRP RESP QL NAA+PROBE: NOT DETECTED
SODIUM SERPL-SCNC: 141 MMOL/L (ref 136–145)
TROPONIN, HIGH SENSITIVITY: 10 NG/L (ref 0–14)
TROPONIN, HIGH SENSITIVITY: 11 NG/L (ref 0–14)
WBC # BLD AUTO: 8.4 K/UL (ref 4–11)

## 2024-02-21 PROCEDURE — 99285 EMERGENCY DEPT VISIT HI MDM: CPT

## 2024-02-21 PROCEDURE — 80048 BASIC METABOLIC PNL TOTAL CA: CPT

## 2024-02-21 PROCEDURE — 94664 DEMO&/EVAL PT USE INHALER: CPT

## 2024-02-21 PROCEDURE — 6370000000 HC RX 637 (ALT 250 FOR IP): Performed by: EMERGENCY MEDICINE

## 2024-02-21 PROCEDURE — 93010 ELECTROCARDIOGRAM REPORT: CPT | Performed by: INTERNAL MEDICINE

## 2024-02-21 PROCEDURE — 87635 SARS-COV-2 COVID-19 AMP PRB: CPT

## 2024-02-21 PROCEDURE — 94640 AIRWAY INHALATION TREATMENT: CPT

## 2024-02-21 PROCEDURE — 84484 ASSAY OF TROPONIN QUANT: CPT

## 2024-02-21 PROCEDURE — 87804 INFLUENZA ASSAY W/OPTIC: CPT

## 2024-02-21 PROCEDURE — 93005 ELECTROCARDIOGRAM TRACING: CPT | Performed by: EMERGENCY MEDICINE

## 2024-02-21 PROCEDURE — 85025 COMPLETE CBC W/AUTO DIFF WBC: CPT

## 2024-02-21 PROCEDURE — 71045 X-RAY EXAM CHEST 1 VIEW: CPT

## 2024-02-21 RX ORDER — ALBUTEROL SULFATE 90 UG/1
2 AEROSOL, METERED RESPIRATORY (INHALATION) 4 TIMES DAILY PRN
Qty: 18 G | Refills: 0 | Status: SHIPPED | OUTPATIENT
Start: 2024-02-21

## 2024-02-21 RX ORDER — IPRATROPIUM BROMIDE AND ALBUTEROL SULFATE 2.5; .5 MG/3ML; MG/3ML
1 SOLUTION RESPIRATORY (INHALATION)
Status: DISCONTINUED | OUTPATIENT
Start: 2024-02-21 | End: 2024-02-21 | Stop reason: HOSPADM

## 2024-02-21 RX ORDER — ASPIRIN 81 MG/1
324 TABLET, CHEWABLE ORAL ONCE
Status: COMPLETED | OUTPATIENT
Start: 2024-02-21 | End: 2024-02-21

## 2024-02-21 RX ADMIN — ASPIRIN 81 MG 324 MG: 81 TABLET ORAL at 10:04

## 2024-02-21 RX ADMIN — IPRATROPIUM BROMIDE AND ALBUTEROL SULFATE 1 DOSE: 2.5; .5 SOLUTION RESPIRATORY (INHALATION) at 11:01

## 2024-02-21 ASSESSMENT — PAIN - FUNCTIONAL ASSESSMENT: PAIN_FUNCTIONAL_ASSESSMENT: NONE - DENIES PAIN

## 2024-02-21 ASSESSMENT — HEART SCORE: ECG: 0

## 2024-02-21 NOTE — DISCHARGE INSTRUCTIONS
Please call your primary care physician today and inform them of your visit here.  Please arrange follow-up to have confirmatory testing.  If your symptoms progress despite treatment please come back to the ER for repeat evaluation.  Your cardiac enzymes, EKG and chest x-ray did not show any acute findings.  Your viral swabs were negative today.

## 2024-02-21 NOTE — ED PROVIDER NOTES
Patient is a 68 year old male who presents c/o cough, sob x 4-5 days. patient has hx of CAD,2 stents placed in November. patient denies chest pain, patient admits to chills/fever, daughter has covid.    no pedal edema, no orthopnea.
  (up to 7 for level 4, 8 or more for level 5)     ED Triage Vitals [02/21/24 0917]   BP Temp Temp Source Pulse Respirations SpO2 Height Weight - Scale   (!) 141/79 97.7 °F (36.5 °C) Oral 75 16 99 % 1.727 m (5' 8\") 99.3 kg (219 lb)       Physical Exam  Vitals and nursing note reviewed.   Constitutional:       General: She is not in acute distress.     Appearance: She is well-developed. She is not diaphoretic.   HENT:      Head: Normocephalic and atraumatic.      Nose: Nose normal.   Eyes:      Conjunctiva/sclera: Conjunctivae normal.      Pupils: Pupils are equal, round, and reactive to light.   Cardiovascular:      Rate and Rhythm: Normal rate and regular rhythm.      Heart sounds: Normal heart sounds. No murmur heard.     No friction rub. No gallop.   Pulmonary:      Effort: Pulmonary effort is normal. No respiratory distress.      Breath sounds: Normal breath sounds. No stridor. No wheezing or rales.   Chest:      Chest wall: No tenderness.   Abdominal:      General: Bowel sounds are normal. There is no distension.      Palpations: Abdomen is soft.      Tenderness: There is no abdominal tenderness. There is no guarding or rebound.   Musculoskeletal:         General: No tenderness or deformity. Normal range of motion.      Cervical back: Normal range of motion and neck supple.   Skin:     General: Skin is warm and dry.      Findings: No erythema or rash.   Neurological:      Mental Status: She is alert and oriented to person, place, and time.      Cranial Nerves: No cranial nerve deficit.   Psychiatric:         Behavior: Behavior normal.         Thought Content: Thought content normal.         Judgment: Judgment normal.         DIAGNOSTIC RESULTS     EKG: All EKG's are interpreted by the Emergency Department Physician who either signs or Co-signs this chart in the absence of a cardiologist.    Normal sinus rhythm at 65.  ID interval 184, QRS 86, QTc 430.  Low voltage EKG.  No acute ischemic changes    RADIOLOGY:

## 2024-06-09 ENCOUNTER — APPOINTMENT (OUTPATIENT)
Dept: GENERAL RADIOLOGY | Age: 59
DRG: 683 | End: 2024-06-09
Payer: COMMERCIAL

## 2024-06-09 ENCOUNTER — APPOINTMENT (OUTPATIENT)
Dept: CT IMAGING | Age: 59
DRG: 683 | End: 2024-06-09
Payer: COMMERCIAL

## 2024-06-09 ENCOUNTER — HOSPITAL ENCOUNTER (INPATIENT)
Age: 59
LOS: 1 days | Discharge: HOME | DRG: 683 | End: 2024-06-11
Attending: EMERGENCY MEDICINE | Admitting: FAMILY MEDICINE
Payer: COMMERCIAL

## 2024-06-09 DIAGNOSIS — R79.89 ELEVATED TROPONIN: ICD-10-CM

## 2024-06-09 DIAGNOSIS — N17.9 AKI (ACUTE KIDNEY INJURY) (HCC): Primary | ICD-10-CM

## 2024-06-09 LAB
ALBUMIN SERPL-MCNC: 4.6 G/DL (ref 3.4–5)
ALBUMIN/GLOB SERPL: 1.4 {RATIO} (ref 1.1–2.2)
ALP SERPL-CCNC: 139 U/L (ref 40–129)
ALT SERPL-CCNC: 24 U/L (ref 10–40)
ANION GAP SERPL CALCULATED.3IONS-SCNC: 19 MMOL/L (ref 3–16)
AST SERPL-CCNC: 24 U/L (ref 15–37)
BASOPHILS # BLD: 0.1 K/UL (ref 0–0.2)
BASOPHILS NFR BLD: 0.4 %
BILIRUB SERPL-MCNC: 0.3 MG/DL (ref 0–1)
BUN SERPL-MCNC: 51 MG/DL (ref 7–20)
CALCIUM SERPL-MCNC: 10.3 MG/DL (ref 8.3–10.6)
CHLORIDE SERPL-SCNC: 93 MMOL/L (ref 99–110)
CO2 SERPL-SCNC: 23 MMOL/L (ref 21–32)
CREAT SERPL-MCNC: 3.1 MG/DL (ref 0.6–1.1)
DEPRECATED RDW RBC AUTO: 13.8 % (ref 12.4–15.4)
EOSINOPHIL # BLD: 0.3 K/UL (ref 0–0.6)
EOSINOPHIL NFR BLD: 1.7 %
GFR SERPLBLD CREATININE-BSD FMLA CKD-EPI: 17 ML/MIN/{1.73_M2}
GLUCOSE SERPL-MCNC: 123 MG/DL (ref 70–99)
HCT VFR BLD AUTO: 45.7 % (ref 36–48)
HGB BLD-MCNC: 15.6 G/DL (ref 12–16)
LYMPHOCYTES # BLD: 2.6 K/UL (ref 1–5.1)
LYMPHOCYTES NFR BLD: 13.2 %
MAGNESIUM SERPL-MCNC: 2.5 MG/DL (ref 1.8–2.4)
MCH RBC QN AUTO: 32.8 PG (ref 26–34)
MCHC RBC AUTO-ENTMCNC: 34.2 G/DL (ref 31–36)
MCV RBC AUTO: 95.9 FL (ref 80–100)
MONOCYTES # BLD: 1 K/UL (ref 0–1.3)
MONOCYTES NFR BLD: 4.8 %
NEUTROPHILS # BLD: 15.9 K/UL (ref 1.7–7.7)
NEUTROPHILS NFR BLD: 79.9 %
PLATELET # BLD AUTO: 153 K/UL (ref 135–450)
PMV BLD AUTO: 9.5 FL (ref 5–10.5)
POTASSIUM SERPL-SCNC: 3.4 MMOL/L (ref 3.5–5.1)
PROT SERPL-MCNC: 7.9 G/DL (ref 6.4–8.2)
RBC # BLD AUTO: 4.76 M/UL (ref 4–5.2)
SODIUM SERPL-SCNC: 135 MMOL/L (ref 136–145)
TROPONIN, HIGH SENSITIVITY: 59 NG/L (ref 0–14)
TROPONIN, HIGH SENSITIVITY: 69 NG/L (ref 0–14)
WBC # BLD AUTO: 20 K/UL (ref 4–11)

## 2024-06-09 PROCEDURE — 93005 ELECTROCARDIOGRAM TRACING: CPT | Performed by: EMERGENCY MEDICINE

## 2024-06-09 PROCEDURE — 71045 X-RAY EXAM CHEST 1 VIEW: CPT

## 2024-06-09 PROCEDURE — 80053 COMPREHEN METABOLIC PANEL: CPT

## 2024-06-09 PROCEDURE — 85025 COMPLETE CBC W/AUTO DIFF WBC: CPT

## 2024-06-09 PROCEDURE — 36415 COLL VENOUS BLD VENIPUNCTURE: CPT

## 2024-06-09 PROCEDURE — 83735 ASSAY OF MAGNESIUM: CPT

## 2024-06-09 PROCEDURE — 2580000003 HC RX 258: Performed by: EMERGENCY MEDICINE

## 2024-06-09 PROCEDURE — 81001 URINALYSIS AUTO W/SCOPE: CPT

## 2024-06-09 PROCEDURE — 84484 ASSAY OF TROPONIN QUANT: CPT

## 2024-06-09 PROCEDURE — 72125 CT NECK SPINE W/O DYE: CPT

## 2024-06-09 PROCEDURE — 99285 EMERGENCY DEPT VISIT HI MDM: CPT

## 2024-06-09 RX ORDER — 0.9 % SODIUM CHLORIDE 0.9 %
1000 INTRAVENOUS SOLUTION INTRAVENOUS ONCE
Status: COMPLETED | OUTPATIENT
Start: 2024-06-09 | End: 2024-06-10

## 2024-06-09 RX ORDER — OXYCODONE AND ACETAMINOPHEN 7.5; 325 MG/1; MG/1
1 TABLET ORAL EVERY 4 HOURS PRN
Status: ON HOLD | COMMUNITY
End: 2024-06-11 | Stop reason: HOSPADM

## 2024-06-09 RX ADMIN — SODIUM CHLORIDE 1000 ML: 0.9 INJECTION, SOLUTION INTRAVENOUS at 22:15

## 2024-06-10 ENCOUNTER — APPOINTMENT (OUTPATIENT)
Dept: CT IMAGING | Age: 59
DRG: 683 | End: 2024-06-10
Payer: COMMERCIAL

## 2024-06-10 ENCOUNTER — APPOINTMENT (OUTPATIENT)
Dept: MRI IMAGING | Age: 59
DRG: 683 | End: 2024-06-10
Payer: COMMERCIAL

## 2024-06-10 PROBLEM — R65.10 SIRS (SYSTEMIC INFLAMMATORY RESPONSE SYNDROME) (HCC): Status: ACTIVE | Noted: 2024-06-10

## 2024-06-10 LAB
ALBUMIN SERPL-MCNC: 3.4 G/DL (ref 3.4–5)
ALBUMIN SERPL-MCNC: 3.5 G/DL (ref 3.4–5)
ALBUMIN/GLOB SERPL: 1.3 {RATIO} (ref 1.1–2.2)
ALP SERPL-CCNC: 123 U/L (ref 40–129)
ALT SERPL-CCNC: 18 U/L (ref 10–40)
ANION GAP SERPL CALCULATED.3IONS-SCNC: 15 MMOL/L (ref 3–16)
ANION GAP SERPL CALCULATED.3IONS-SCNC: 17 MMOL/L (ref 3–16)
AST SERPL-CCNC: 21 U/L (ref 15–37)
BACTERIA URNS QL MICRO: ABNORMAL /HPF
BILIRUB SERPL-MCNC: <0.2 MG/DL (ref 0–1)
BILIRUB UR QL STRIP.AUTO: NEGATIVE
BUN SERPL-MCNC: 39 MG/DL (ref 7–20)
BUN SERPL-MCNC: 40 MG/DL (ref 7–20)
CALCIUM SERPL-MCNC: 8.5 MG/DL (ref 8.3–10.6)
CALCIUM SERPL-MCNC: 8.6 MG/DL (ref 8.3–10.6)
CHLORIDE SERPL-SCNC: 105 MMOL/L (ref 99–110)
CHLORIDE SERPL-SCNC: 107 MMOL/L (ref 99–110)
CLARITY UR: CLEAR
CO2 SERPL-SCNC: 20 MMOL/L (ref 21–32)
CO2 SERPL-SCNC: 20 MMOL/L (ref 21–32)
COLOR UR: YELLOW
CREAT SERPL-MCNC: 1.8 MG/DL (ref 0.6–1.1)
CREAT SERPL-MCNC: 1.8 MG/DL (ref 0.6–1.1)
EKG ATRIAL RATE: 78 BPM
EKG ATRIAL RATE: 84 BPM
EKG DIAGNOSIS: NORMAL
EKG DIAGNOSIS: NORMAL
EKG P AXIS: 46 DEGREES
EKG P AXIS: 55 DEGREES
EKG P-R INTERVAL: 194 MS
EKG P-R INTERVAL: 208 MS
EKG Q-T INTERVAL: 380 MS
EKG Q-T INTERVAL: 420 MS
EKG QRS DURATION: 92 MS
EKG QRS DURATION: 92 MS
EKG QTC CALCULATION (BAZETT): 449 MS
EKG QTC CALCULATION (BAZETT): 478 MS
EKG R AXIS: 26 DEGREES
EKG R AXIS: 50 DEGREES
EKG T AXIS: 47 DEGREES
EKG T AXIS: 50 DEGREES
EKG VENTRICULAR RATE: 78 BPM
EKG VENTRICULAR RATE: 84 BPM
EPI CELLS #/AREA URNS HPF: ABNORMAL /HPF (ref 0–5)
GFR SERPLBLD CREATININE-BSD FMLA CKD-EPI: 32 ML/MIN/{1.73_M2}
GFR SERPLBLD CREATININE-BSD FMLA CKD-EPI: 32 ML/MIN/{1.73_M2}
GLUCOSE SERPL-MCNC: 90 MG/DL (ref 70–99)
GLUCOSE SERPL-MCNC: 91 MG/DL (ref 70–99)
GLUCOSE UR STRIP.AUTO-MCNC: NEGATIVE MG/DL
HGB UR QL STRIP.AUTO: ABNORMAL
KETONES UR STRIP.AUTO-MCNC: NEGATIVE MG/DL
LACTATE BLDV-SCNC: 0.7 MMOL/L (ref 0.4–1.9)
LACTATE BLDV-SCNC: 0.9 MMOL/L (ref 0.4–1.9)
LEUKOCYTE ESTERASE UR QL STRIP.AUTO: NEGATIVE
MAGNESIUM SERPL-MCNC: 2.2 MG/DL (ref 1.8–2.4)
NITRITE UR QL STRIP.AUTO: NEGATIVE
PH UR STRIP.AUTO: 5 [PH] (ref 5–8)
PHOSPHATE SERPL-MCNC: 3.6 MG/DL (ref 2.5–4.9)
POTASSIUM SERPL-SCNC: 3.7 MMOL/L (ref 3.5–5.1)
POTASSIUM SERPL-SCNC: 3.7 MMOL/L (ref 3.5–5.1)
PROT SERPL-MCNC: 6.1 G/DL (ref 6.4–8.2)
PROT UR STRIP.AUTO-MCNC: 30 MG/DL
RBC #/AREA URNS HPF: ABNORMAL /HPF (ref 0–4)
SODIUM SERPL-SCNC: 142 MMOL/L (ref 136–145)
SODIUM SERPL-SCNC: 142 MMOL/L (ref 136–145)
SP GR UR STRIP.AUTO: 1.02 (ref 1–1.03)
TROPONIN, HIGH SENSITIVITY: 55 NG/L (ref 0–14)
TROPONIN, HIGH SENSITIVITY: 57 NG/L (ref 0–14)
UA COMPLETE W REFLEX CULTURE PNL UR: ABNORMAL
UA DIPSTICK W REFLEX MICRO PNL UR: YES
URN SPEC COLLECT METH UR: ABNORMAL
UROBILINOGEN UR STRIP-ACNC: 0.2 E.U./DL
WBC #/AREA URNS HPF: ABNORMAL /HPF (ref 0–5)

## 2024-06-10 PROCEDURE — 2060000000 HC ICU INTERMEDIATE R&B

## 2024-06-10 PROCEDURE — 93005 ELECTROCARDIOGRAM TRACING: CPT | Performed by: FAMILY MEDICINE

## 2024-06-10 PROCEDURE — 84484 ASSAY OF TROPONIN QUANT: CPT

## 2024-06-10 PROCEDURE — 36415 COLL VENOUS BLD VENIPUNCTURE: CPT

## 2024-06-10 PROCEDURE — 2580000003 HC RX 258

## 2024-06-10 PROCEDURE — 72131 CT LUMBAR SPINE W/O DYE: CPT

## 2024-06-10 PROCEDURE — 6370000000 HC RX 637 (ALT 250 FOR IP): Performed by: SURGERY

## 2024-06-10 PROCEDURE — 72128 CT CHEST SPINE W/O DYE: CPT

## 2024-06-10 PROCEDURE — 83735 ASSAY OF MAGNESIUM: CPT

## 2024-06-10 PROCEDURE — 83605 ASSAY OF LACTIC ACID: CPT

## 2024-06-10 PROCEDURE — 6370000000 HC RX 637 (ALT 250 FOR IP): Performed by: FAMILY MEDICINE

## 2024-06-10 PROCEDURE — 2580000003 HC RX 258: Performed by: FAMILY MEDICINE

## 2024-06-10 PROCEDURE — 87040 BLOOD CULTURE FOR BACTERIA: CPT

## 2024-06-10 PROCEDURE — 93010 ELECTROCARDIOGRAM REPORT: CPT | Performed by: INTERNAL MEDICINE

## 2024-06-10 PROCEDURE — 99223 1ST HOSP IP/OBS HIGH 75: CPT | Performed by: FAMILY MEDICINE

## 2024-06-10 PROCEDURE — 80053 COMPREHEN METABOLIC PANEL: CPT

## 2024-06-10 PROCEDURE — 6360000002 HC RX W HCPCS: Performed by: FAMILY MEDICINE

## 2024-06-10 RX ORDER — ACETAMINOPHEN 325 MG/1
650 TABLET ORAL EVERY 6 HOURS PRN
Status: DISCONTINUED | OUTPATIENT
Start: 2024-06-10 | End: 2024-06-11 | Stop reason: HOSPADM

## 2024-06-10 RX ORDER — ACETAMINOPHEN 650 MG/1
650 SUPPOSITORY RECTAL EVERY 6 HOURS PRN
Status: DISCONTINUED | OUTPATIENT
Start: 2024-06-10 | End: 2024-06-11 | Stop reason: HOSPADM

## 2024-06-10 RX ORDER — OXYCODONE HYDROCHLORIDE 15 MG/1
15 TABLET ORAL EVERY 6 HOURS PRN
Status: DISCONTINUED | OUTPATIENT
Start: 2024-06-10 | End: 2024-06-11 | Stop reason: HOSPADM

## 2024-06-10 RX ORDER — ATORVASTATIN CALCIUM 40 MG/1
40 TABLET, FILM COATED ORAL NIGHTLY
Status: DISCONTINUED | OUTPATIENT
Start: 2024-06-10 | End: 2024-06-11 | Stop reason: HOSPADM

## 2024-06-10 RX ORDER — ASPIRIN 81 MG/1
81 TABLET, CHEWABLE ORAL DAILY
Status: DISCONTINUED | OUTPATIENT
Start: 2024-06-11 | End: 2024-06-11 | Stop reason: HOSPADM

## 2024-06-10 RX ORDER — ENOXAPARIN SODIUM 100 MG/ML
40 INJECTION SUBCUTANEOUS DAILY
Status: DISCONTINUED | OUTPATIENT
Start: 2024-06-11 | End: 2024-06-11 | Stop reason: HOSPADM

## 2024-06-10 RX ORDER — SODIUM CHLORIDE 9 MG/ML
INJECTION, SOLUTION INTRAVENOUS CONTINUOUS
Status: ACTIVE | OUTPATIENT
Start: 2024-06-10 | End: 2024-06-10

## 2024-06-10 RX ORDER — ENOXAPARIN SODIUM 100 MG/ML
30 INJECTION SUBCUTANEOUS DAILY
Status: DISCONTINUED | OUTPATIENT
Start: 2024-06-10 | End: 2024-06-10

## 2024-06-10 RX ORDER — PROMETHAZINE HYDROCHLORIDE 25 MG/1
12.5 TABLET ORAL EVERY 6 HOURS PRN
Status: DISCONTINUED | OUTPATIENT
Start: 2024-06-10 | End: 2024-06-11 | Stop reason: HOSPADM

## 2024-06-10 RX ORDER — SODIUM CHLORIDE 9 MG/ML
INJECTION, SOLUTION INTRAVENOUS PRN
Status: DISCONTINUED | OUTPATIENT
Start: 2024-06-10 | End: 2024-06-11 | Stop reason: HOSPADM

## 2024-06-10 RX ORDER — POLYETHYLENE GLYCOL 3350 17 G/17G
17 POWDER, FOR SOLUTION ORAL DAILY PRN
Status: DISCONTINUED | OUTPATIENT
Start: 2024-06-10 | End: 2024-06-11 | Stop reason: HOSPADM

## 2024-06-10 RX ORDER — SODIUM CHLORIDE 0.9 % (FLUSH) 0.9 %
5-40 SYRINGE (ML) INJECTION EVERY 12 HOURS SCHEDULED
Status: DISCONTINUED | OUTPATIENT
Start: 2024-06-10 | End: 2024-06-11 | Stop reason: HOSPADM

## 2024-06-10 RX ORDER — SODIUM CHLORIDE 0.9 % (FLUSH) 0.9 %
5-40 SYRINGE (ML) INJECTION PRN
Status: DISCONTINUED | OUTPATIENT
Start: 2024-06-10 | End: 2024-06-11 | Stop reason: HOSPADM

## 2024-06-10 RX ORDER — POTASSIUM CHLORIDE 20 MEQ/1
40 TABLET, EXTENDED RELEASE ORAL ONCE
Status: COMPLETED | OUTPATIENT
Start: 2024-06-10 | End: 2024-06-10

## 2024-06-10 RX ORDER — ONDANSETRON 2 MG/ML
4 INJECTION INTRAMUSCULAR; INTRAVENOUS EVERY 6 HOURS PRN
Status: DISCONTINUED | OUTPATIENT
Start: 2024-06-10 | End: 2024-06-11 | Stop reason: HOSPADM

## 2024-06-10 RX ORDER — 0.9 % SODIUM CHLORIDE 0.9 %
30 INTRAVENOUS SOLUTION INTRAVENOUS ONCE
Status: COMPLETED | OUTPATIENT
Start: 2024-06-10 | End: 2024-06-10

## 2024-06-10 RX ADMIN — VANCOMYCIN HYDROCHLORIDE 2250 MG: 10 INJECTION, POWDER, LYOPHILIZED, FOR SOLUTION INTRAVENOUS at 05:05

## 2024-06-10 RX ADMIN — AMPICILLIN SODIUM 2000 MG: 2 INJECTION, POWDER, FOR SOLUTION INTRAMUSCULAR; INTRAVENOUS at 20:32

## 2024-06-10 RX ADMIN — SODIUM CHLORIDE, PRESERVATIVE FREE 10 ML: 5 INJECTION INTRAVENOUS at 08:28

## 2024-06-10 RX ADMIN — ENOXAPARIN SODIUM 30 MG: 100 INJECTION SUBCUTANEOUS at 08:27

## 2024-06-10 RX ADMIN — CEFTRIAXONE SODIUM 2000 MG: 2 INJECTION, POWDER, FOR SOLUTION INTRAMUSCULAR; INTRAVENOUS at 20:34

## 2024-06-10 RX ADMIN — SODIUM CHLORIDE 1000 ML: 9 INJECTION, SOLUTION INTRAVENOUS at 03:48

## 2024-06-10 RX ADMIN — SODIUM CHLORIDE: 9 INJECTION, SOLUTION INTRAVENOUS at 10:59

## 2024-06-10 RX ADMIN — CEFTRIAXONE SODIUM 2000 MG: 2 INJECTION, POWDER, FOR SOLUTION INTRAMUSCULAR; INTRAVENOUS at 08:27

## 2024-06-10 RX ADMIN — CEFEPIME 2000 MG: 2 INJECTION, POWDER, FOR SOLUTION INTRAVENOUS at 05:06

## 2024-06-10 RX ADMIN — AMPICILLIN SODIUM 2000 MG: 2 INJECTION, POWDER, FOR SOLUTION INTRAMUSCULAR; INTRAVENOUS at 14:19

## 2024-06-10 RX ADMIN — SODIUM CHLORIDE, PRESERVATIVE FREE 10 ML: 5 INJECTION INTRAVENOUS at 20:33

## 2024-06-10 RX ADMIN — AMPICILLIN SODIUM 2000 MG: 2 INJECTION, POWDER, FOR SOLUTION INTRAMUSCULAR; INTRAVENOUS at 08:37

## 2024-06-10 RX ADMIN — OXYCODONE HYDROCHLORIDE 15 MG: 15 TABLET ORAL at 14:52

## 2024-06-10 RX ADMIN — ATORVASTATIN CALCIUM 40 MG: 40 TABLET, FILM COATED ORAL at 20:33

## 2024-06-10 RX ADMIN — POTASSIUM CHLORIDE 40 MEQ: 1500 TABLET, EXTENDED RELEASE ORAL at 08:27

## 2024-06-10 ASSESSMENT — PAIN - FUNCTIONAL ASSESSMENT
PAIN_FUNCTIONAL_ASSESSMENT: ACTIVITIES ARE NOT PREVENTED
PAIN_FUNCTIONAL_ASSESSMENT: ACTIVITIES ARE NOT PREVENTED

## 2024-06-10 ASSESSMENT — PAIN DESCRIPTION - DESCRIPTORS
DESCRIPTORS: ACHING;DISCOMFORT
DESCRIPTORS: ACHING
DESCRIPTORS: ACHING;BURNING

## 2024-06-10 ASSESSMENT — PAIN SCALES - GENERAL
PAINLEVEL_OUTOF10: 7
PAINLEVEL_OUTOF10: 0
PAINLEVEL_OUTOF10: 1
PAINLEVEL_OUTOF10: 0
PAINLEVEL_OUTOF10: 5

## 2024-06-10 ASSESSMENT — PAIN DESCRIPTION - PAIN TYPE: TYPE: CHRONIC PAIN

## 2024-06-10 ASSESSMENT — PAIN DESCRIPTION - ORIENTATION
ORIENTATION: MID

## 2024-06-10 ASSESSMENT — PAIN SCALES - WONG BAKER: WONGBAKER_NUMERICALRESPONSE: NO HURT

## 2024-06-10 ASSESSMENT — PAIN DESCRIPTION - FREQUENCY
FREQUENCY: INTERMITTENT
FREQUENCY: INTERMITTENT

## 2024-06-10 ASSESSMENT — PAIN DESCRIPTION - LOCATION
LOCATION: BACK
LOCATION: BACK
LOCATION: NECK

## 2024-06-10 ASSESSMENT — PAIN DESCRIPTION - ONSET
ONSET: ON-GOING
ONSET: ON-GOING

## 2024-06-10 NOTE — CONSULTS
Consult received.  Labs and notes were reviewed.  Case was discussed with the staff.    Full note to follow.    Thanks  Nephrology  [address] [place] [address] [phone]  [place] [address]  Office: [phone]  Cell: [phone]  Fax: [phone]

## 2024-06-10 NOTE — PROGRESS NOTES
4 Eyes Skin Assessment     NAME:  Barbi Cosby  YOB: 1965  MEDICAL RECORD NUMBER:  [id number]    The patient is being assessed for  Admission    I agree that at least one RN has performed a thorough Head to Toe Skin Assessment on the patient. ALL assessment sites listed below have been assessed.      Areas assessed by both nurses:    Head, Face, Ears, Shoulders, Back, Chest, Arms, Elbows, Hands, Sacrum. Buttock, Coccyx, Ischium, Legs. Feet and Heels, and Under Medical Devices         Does the Patient have a Wound? No noted wound(s)       Julio Prevention initiated by RN: No  Wound Care Orders initiated by RN: No    Pressure Injury (Stage 3,4, Unstageable, DTI, NWPT, and Complex wounds) if present, place Wound referral order by RN under : No    New Ostomies, if present place, Ostomy referral order under : No     Nurse 1 eSignature: Electronically signed by Alondra Stallworth RN on 6/10/24 at 4:06 AM EDT    **SHARE this note so that the co-signing nurse can place an eSignature**    Nurse 2 eSignature: Electronically signed by Sayra Domínguez RN on 6/10/24 at 3:45 AM EDT

## 2024-06-10 NOTE — PROGRESS NOTES
Pharmacist Review and Automatic Dose Adjustment of Prophylactic Enoxaparin    The reviewing pharmacist has made an adjustment to the ordered enoxaparin dose or converted to UFH per the approved [facility] protocol and table as defined below.    Plan / Rationale: Based upon the patient's weight and renal function, the ordered dose of enoxaparin 30 mg subQ daily has been converted to 40 mg subQ daily.    Please call with questions--  Sina PerezD, [facility]  Wireless: [phone]   6/10/2024 10:32 AM        Barbi Cosby is a 58 y.o. female.     Recent Labs     06/09/24 2228 06/10/24  0856   CREATININE 3.1* 1.8*  1.8*       Estimated Creatinine Clearance: 41 mL/min (A) (based on SCr of 1.8 mg/dL (H)).    Recent Labs     06/09/24 2228   HGB 15.6   HCT 45.7        No results for input(s): \"INR\" in the last 72 hours.    Height:   Ht Readings from Last 1 Encounters:   06/09/24 1.727 m (5' 8\")     Weight:  Wt Readings from Last 1 Encounters:   06/10/24 94 kg (207 lb 3.7 oz)

## 2024-06-10 NOTE — H&P
Coordination: Coordination is intact.           Diet: []Adult/General  []Cardiac  []Diabetic  []Low Fat  [x]NPO  []NPO after Midnight  []Other   DVT Prophylaxis: [x]PPx LMWH  []SQ Heparin  []IPC/SCDs  []Eliquis  []Xarelto  []Coumadin     Code status: [x]Full  []DNR/CCA  []Limited (DNR/CCA with Do Not Intubate)  []DNRCC  PT/OT Eval Status:   []NOT yet ordered  []Ordered and Pending   []Seen with Recommendations for:  []Home independently  []Home w/ assist  []HHC  []SNF  []Acute Rehab    Anticipated Discharge Day/Date: 6/12/2024    Anticipated Discharge Location: []Home  []HHC  []SNF  []Acute Rehab  []ECF  []LTAC  []Hospice    Consults:      PHARMACY TO DOSE VANCOMYCIN      This patient has a high likelihood of being discharged tomorrow and is appropriate for A1 Discharge Unit in AM pending clinical course overnight: []Yes  []No    --------------------------------------------------------------------------------------------------------------------------------------------------------------------    Imaging:     CT CERVICAL SPINE WO CONTRAST    Result Date: 6/9/2024  PROCEDURE: Computed tomography (CT) of the cervical spine without contrast INDICATION: neck pain COMPARISON: None TECHNIQUE: CT of the cervical spine was performed without contrast according to standard protocol. Up-to-date CT equipment and radiation dose reduction techniques were employed. FINDINGS: There is grade 1 anterolisthesis of C7 on T1. Vertebral bodies are normal in height without evidence of acute fracture. The craniocervical junction is normal. There is severe C4-5 through C6/7 disc height loss. Nuchal ligament ossification is incidentally noted posteriorly at C5 level. A right paracentral posterior disc osteophyte complex at C4-5 results in moderate to severe spinal canal stenosis. There is also diffuse disc bulge at C2-3 and C3-4 with mild spinal canal stenosis and diffuse disc bulge at C5-6 with moderate spinal canal stenosis. There is severe  mouth 3 times daily as needed for Muscle spasms  Patient not taking: Reported on 6/10/2024    Renae Alves MD   amLODIPine (NORVASC) 10 MG tablet Take 1 tablet by mouth daily    Renae Alves MD   metoprolol succinate (TOPROL XL) 50 MG extended release tablet Take 1 tablet by mouth daily    Renae Alves MD   venlafaxine 150 MG extended release tablet Take 150 mg by mouth daily (with breakfast)  Patient not taking: Reported on 6/10/2024    Renae Alves MD   ATORVASTATIN CALCIUM PO Take by mouth    Renae Alves MD   Capsaicin ([employer] CAPSAICIN HP) 0.1 % CREA Apply 1 g topically every 4-6 hours as needed (leg pain)  Patient not taking: Reported on 6/10/2024 8/11/20   Konstantin Cuellar MD       Labs: Personally reviewed and interpreted for clinical significance.   Recent Labs     06/09/24 2228   WBC 20.0*   HGB 15.6   HCT 45.7        Recent Labs     06/09/24 2228   *   K 3.4*   CL 93*   CO2 23   BUN 51*   CREATININE 3.1*   CALCIUM 10.3   MG 2.50*     Recent Labs     06/09/24 2228 06/09/24  2321 06/10/24  0422   TROPHS 69* 59* 55*     No results for input(s): \"LABA1C\" in the last 72 hours.  Recent Labs     06/09/24 2228   AST 24   ALT 24   BILITOT 0.3   ALKPHOS 139*     No results for input(s): \"INR\", \"LACTA\", \"TSH\" in the last 72 hours.     Liban Noland MD

## 2024-06-10 NOTE — PROGRESS NOTES
[facility] -  Clinical Pharmacy Note    Vancomycin - Management by Pharmacy    Consult Date(s): 06/10/2024  Consulting Provider(s): Dr. Liban Noland    Assessment / Plan  Sepsis of CNS Origin - Vancomycin  Concurrent Antimicrobials: Cefepime  Day of Vanc Therapy / Ordered Duration: #1 of 7  Current Dosing Method: Intermittent Dosing by Levels  Therapeutic Goal: Trough ~ 15 mg/L  Current Dose / Plan:   Admitted with STEW. SCr improved from 3.1?1.8 overnight  Baseline SCr ~1 (Feb 2024)  Received loading dose of 2250mg IV (~25 mg/kg) this AM.  Given STEW, will dose vancomycin based on intermittent levels. Have entered placeholder onto MAR.  Vancomycin level ordered for tomorrow AM, Tues 6/11 to assess timing of next dose.  Will continue to monitor clinical condition and make adjustments to regimen as appropriate.    Please call with questions--  Consuelo Pizano, PharmD, BCPS  Wireless: [phone]   6/10/2024 10:20 AM        Interval update:  Continues on broad spectrum ABx to include possible CNS source. SCr improved from 3.1?1.8.    Subjective/Objective:   Barbi Cosby is a 58 y.o. female with a PMHx significant for HLD, HTN, recent lumbar spinal ablation who presented 6/9 with neck/back pain. Admitted with SIRS vs sepsis, STEW and neck/spinal pain.    Pharmacy is consulted to dose Vancomycin.    Ht Readings from Last 1 Encounters:   06/09/24 1.727 m (5' 8\")     Wt Readings from Last 1 Encounters:   06/10/24 94 kg (207 lb 3.7 oz)     Current & Prior Antimicrobial Regimen(s):  Ampicillin (6/10-current)  Ceftriaxone (6/10-current)  Vancomycin - Pharmacy to dose  Intermittent dosing based on levels (6/9-current)  2250mg IV x1 6/9    Vancomycin Level(s) / Doses:  Date Time Dose Type of Level / Level Interpretation   6/10 0505 2250mg IV x1 -- Loading dose - ~24 mg/kg  SCr improved from yesterday; 3.1?1.8          Note: Serum levels collected for AUC-based dosing may be high if collected in close proximity to the

## 2024-06-10 NOTE — CARE COORDINATION
Case Management Assessment/ Note  Date/ Time of Note: 6/10/2024 1:50 PM  Note completed by: Woody Wagner RN    If patient is discharged prior to next notation, then this note serves as note for discharge by case management.    Patient Name: Barbi Cosby  YOB: 1965    Diagnosis:Elevated troponin [R79.89]  SIRS (systemic inflammatory response syndrome) (HCC) [R65.10]  STEW (acute kidney injury) (HCC) [N17.9]  Patient Admission Status: Inpatient  Date of Admission:6/9/2024  9:48 PM    Length of Stay: 0 GLOS:   Readmission Risk Score: Readmission Risk Score: 10.1    ________________________________________________________________________________________  PT AM-PAC:   / 24 per last evaluation on: NA    OT AM-PAC:   / 24 per last evaluation on: NA    DME Needs for discharge: NA  DME Ordered: . DME Delivered: .  DME Company: .  ________________________________________________________________________________________  Discharge Plan: Home  Pre-cert required for SNF: Not Applicable  COVID Result:    Lab Results   Component Value Date/Time    COVID19 Not Detected 02/21/2024 09:30 AM    COVID19 Detected 11/13/2021 07:01 PM       Transportation PLAN for discharge: family    Tentative discharge date: TBD    Potential assistance Purchasing Medications: Potential Assistance Purchasing Medications: No  Does Patient want to participate in local refill/ meds to beds program?:      Current barriers to discharge: Medical complications    Referrals completed: Not Applicable    Resources/ information provided: Not indicated at this time  ________________________________________________________________________________________  Case Management Notes: Upon review of patients chart; pt is from home, IPTA, no current home services.   Pt will not need transport assistance at time of dc. No CM/SW needs anticipated. CM will sign off at this time. Please consult CM/SW if any dcp needs arise.    Admitted for Elevated troponin

## 2024-06-10 NOTE — PLAN OF CARE
Problem: Discharge Planning  Goal: Discharge to home or other facility with appropriate resources  Outcome: Progressing  Flowsheets (Taken 6/10/2024 0403)  Discharge to home or other facility with appropriate resources:   Identify barriers to discharge with patient and caregiver   Arrange for needed discharge resources and transportation as appropriate   Arrange for interpreters to assist at discharge as needed     Problem: Pain  Goal: Verbalizes/displays adequate comfort level or baseline comfort level  Outcome: Progressing  Flowsheets (Taken 6/10/2024 0403)  Verbalizes/displays adequate comfort level or baseline comfort level:   Encourage patient to monitor pain and request assistance   Assess pain using appropriate pain scale   Administer analgesics based on type and severity of pain and evaluate response   Implement non-pharmacological measures as appropriate and evaluate response     Problem: Safety - Adult  Goal: Free from fall injury  Outcome: Progressing  Flowsheets (Taken 6/10/2024 0403)  Free From Fall Injury:   Instruct family/caregiver on patient safety   Based on caregiver fall risk screen, instruct family/caregiver to ask for assistance with transferring infant if caregiver noted to have fall risk factors

## 2024-06-10 NOTE — ED NOTES
Report given to [facility] Transfer crew and floor notified of patient en route to [facility]; SBAR Completed and informed to have RN call with any questions.

## 2024-06-10 NOTE — CONSULTS
3.4, hypochloremia to 93, BUN 51, Cr 3.1, anion gap 19, bicarb 23. Additionally WBC was elevated to 20. UA has small amount of blood and protein, Microscopy with signs of contamination with 10 epithelial cells and 1+ bacteria.    Patient started on vanc and rocephin out of concern for sepsis.     Patient does report increased NSAID use over the past couple weeks. She states that yesterday she used four ibuprofen, day prior multiple alleves, daily use for the past couple weeks.    ROS:     Seen with- medical residents    positives in bold   Constitutional:  fever, chills, weakness, weight change, fatigue  Skin:  rash, pruritus, hair loss, bruising, dry skin, petechiae  Head, Face, Neck   headaches, swelling,  cervical adenopathy  Respiratory: shortness of breath, cough, or wheezing  Cardiovascular: chest pain, palpitations, dizzy, edema  Gastrointestinal: nausea, vomiting, diarrhea, constipation,belly pain    Yellow skin, blood in stool  Musculoskeletal:  back pain, muscle weakness, gait problems,       joint pain or swelling.  Genitourinary:  dysuria, poor urine flow, flank pain, blood in urine  Neurologic:  vertigo, TIA'S, syncope, seizures, focal weakness  Psychosocial:  insomnia, anxiety, or depression.                       All other remaining systems are negative or unable to obtain        PMH/PSH/SH/Family History:     Past Medical History:   Diagnosis Date    Degenerative disc disease, lumbar     Hyperlipidemia     Hypertension     Spinal stenosis        History reviewed. No pertinent surgical history.     reports that she has been smoking cigarettes. She has never used smokeless tobacco. Drug use questions deferred to the physician. She reports that she does not drink alcohol.    family history is not on file.         Medication:     Current Facility-Administered Medications: sodium chloride flush 0.9 % injection 5-40 mL, 5-40 mL, IntraVENous, 2 times per day  sodium chloride flush 0.9 % injection 5-40 mL,  5-40 mL, IntraVENous, PRN  0.9 % sodium chloride infusion, , IntraVENous, PRN  acetaminophen (TYLENOL) tablet 650 mg, 650 mg, Oral, Q6H PRN **OR** acetaminophen (TYLENOL) suppository 650 mg, 650 mg, Rectal, Q6H PRN  polyethylene glycol (GLYCOLAX) packet 17 g, 17 g, Oral, Daily PRN  [START ON 6/11/2024] aspirin chewable tablet 81 mg, 81 mg, Oral, Daily  atorvastatin (LIPITOR) tablet 40 mg, 40 mg, Oral, Nightly  enoxaparin Sodium (LOVENOX) injection 30 mg, 30 mg, SubCUTAneous, Daily  promethazine (PHENERGAN) tablet 12.5 mg, 12.5 mg, Oral, Q6H PRN **OR** ondansetron (ZOFRAN) injection 4 mg, 4 mg, IntraVENous, Q6H PRN  cefTRIAXone (ROCEPHIN) 2,000 mg in sterile water 20 mL IV syringe, 2,000 mg, IntraVENous, Q12H  ampicillin (OMNIPEN) 2,000 mg in sodium chloride 0.9 % 100 mL IVPB (mini-bag), 2,000 mg, IntraVENous, 6 times per day  vancomycin (VANCOCIN) intermittent dosing (placeholder), , Other, RX Placeholder  HYDROmorphone (DILAUDID) injection 1 mg, 1 mg, IntraVENous, Q4H PRN  oxyCODONE (OXY-IR) immediate release tablet 15 mg, 15 mg, Oral, Q6H PRN       Vitals :     Vitals:    06/10/24 0358   BP:    Pulse: 95   Resp:    Temp:    SpO2:        I & O :       Intake/Output Summary (Last 24 hours) at 6/10/2024 0911  Last data filed at 6/10/2024 0400  Gross per 24 hour   Intake 0 ml   Output --   Net 0 ml        Physical Examination :     General appearance: Anxious- no, distressed- no, in good spirits- yes, Appearance- appears uncomfortable  HEENT: Lips- normal, teeth- ok , oral mucosa- moist  Neck : Mass- no, appears symmetrical, JVD- not visible  Respiratory: Respiratory effort-  normal, wheeze- no, crackles - none  Cardiovascular: Ausculation- No M/R/G, Edema trace to shins b/l  Abdomen: visible mass- no, distention- no, scar- no, tenderness- no                            hepatosplenomegaly-  no  Musculoskeletal:  clubbing no,cyanosis- no , digital ischemia- no                           muscle strength- grossly normal ,

## 2024-06-10 NOTE — ED NOTES
ED TO INPATIENT SBAR HANDOFF    Patient Name: Barbi Cosby   :  1965  58 y.o.   MRN:  [id number]  Preferred Name  Barbi  ED Room #:    Family/Caregiver Present no   Restraints no   Sitter no   Sepsis Risk Score Sepsis Risk Score: 4.52    Situation  Code Status: No Order No additional code details.    Allergies: Patient has no known allergies.  Weight: Patient Vitals for the past 96 hrs (Last 3 readings):   Weight   24 2152 92.9 kg (204 lb 14.4 oz)     Arrived from: home  Chief Complaint:   Chief Complaint   Patient presents with    Back Pain    Neck Pain     C/o Mid to Upper Back Pain that is going into neck. Had \"ablation\"; burned the nerves per patient about a month ago to lower back. States has never had neck pain before. Takes BP meds at night and had a Percocet about an hour ago.      Hospital Problem/Diagnosis:  Principal Problem:    SIRS (systemic inflammatory response syndrome) (HCC)  Resolved Problems:    * No resolved hospital problems. *    Imaging:   XR CHEST PORTABLE   Final Result      No acute pulmonary disease.         Electronically signed by Faraz Schuster      CT CERVICAL SPINE WO CONTRAST   Final Result      1.  No acute fracture.   2.  Cervical spondylosis with moderate to severe C4-5 and moderate C5-6 spinal   canal stenosis. There is also up to severe right C3-4, severe bilateral C4-5,   and severe right C5-6 neural foraminal narrowing.      Electronically signed by Faraz Schuster        Abnormal labs:   Abnormal Labs Reviewed   CBC WITH AUTO DIFFERENTIAL - Abnormal; Notable for the following components:       Result Value    WBC 20.0 (*)     Neutrophils Absolute 15.9 (*)     All other components within normal limits   COMPREHENSIVE METABOLIC PANEL W/ REFLEX TO MG FOR LOW K - Abnormal; Notable for the following components:    Sodium 135 (*)     Potassium reflex Magnesium 3.4 (*)     Chloride 93 (*)     Anion Gap 19 (*)     Glucose 123 (*)     BUN 51 (*)     Creatinine 3.1 (*)      Est, Glom Filt Rate 17 (*)     Alkaline Phosphatase 139 (*)     All other components within normal limits   URINALYSIS WITH REFLEX TO CULTURE - Abnormal; Notable for the following components:    Blood, Urine SMALL (*)     Protein, UA 30 (*)     All other components within normal limits   TROPONIN - Abnormal; Notable for the following components:    Troponin, High Sensitivity 69 (*)     All other components within normal limits   TROPONIN - Abnormal; Notable for the following components:    Troponin, High Sensitivity 59 (*)     All other components within normal limits   MAGNESIUM - Abnormal; Notable for the following components:    Magnesium 2.50 (*)     All other components within normal limits   MICROSCOPIC URINALYSIS - Abnormal; Notable for the following components:    WBC, UA 6-9 (*)     Epithelial Cells, UA 6-10 (*)     Bacteria, UA 1+ (*)     All other components within normal limits     Critical values: yes     Abnormal Assessment Findings: Trop 69; Trop2 59; Mag 2.5; BUN 51, CR 3.1; WBC 20    Background  History:   Past Medical History:   Diagnosis Date    Degenerative disc disease, lumbar     Hyperlipidemia     Hypertension     Spinal stenosis        Assessment    Vitals/MEWS:        Vitals:    06/09/24 2330 06/10/24 0008 06/10/24 0030 06/10/24 0100   BP: 114/71 (!) 132/59 (!) 117/56 103/73   Pulse: 94 99 96 92   Resp:  16  16   Temp:       TempSrc:       SpO2: 98% 100% (!) 87% 99%   Weight:       Height:         FiO2 (%): RA 97%  O2 Flow Rate: O2 Device: None (Room air)    Cardiac Rhythm: Cardiac Rhythm: Sinus rhythm  Pain Assessment: 8 [x] Verbal [] Kruger Sosa Scale  Pain Scale:  Back and Neck Pain  Last documented pain score (0-10 scale)    Last documented pain medication administered: None  Mental Status: oriented, alert, coherent, logical, thought processes intact, and able to concentrate and follow conversation  Orientation Level:    NIH Score:    C-SSRS: Risk of Suicide: No Risk  Bedside swallow:

## 2024-06-10 NOTE — CONSULTS
[facility] -  Clinical Pharmacy Note    Vancomycin - Management by Pharmacy    Consult Date(s): 06/10/2024  Consulting Provider(s): Dr. Liban Noland    Assessment / Plan  Sepsis of CNS Origin - Vancomycin  Concurrent Antimicrobials: Cefepime  Day of Vanc Therapy / Ordered Duration: Day 1 of 7  Current Dosing Method: Intermittent Dosing by Levels  Therapeutic Goal: Trough ~ 15 mg/L  Current Dose / Plan:   Vancomycin 2250 mg (~25 mg/kg) IV x1  Will dose based on Vancomycin levels due to unstable renal function  Obtaining level 6/11 AM  Will continue to monitor clinical condition and make adjustments to regimen as appropriate.    Thank you for consulting pharmacy,      Marion Silverio, PharmD  [id number] (Main Pharmacy)       Interval update:  therapy initiation     Subjective/Objective:   Barbi Cosby is a 58 y.o. female with a PMHx significant for HLD, HTN, and lower back pain who is admitted with SIRS.     Pharmacy is consulted to dose Vancomycin.    Ht Readings from Last 1 Encounters:   06/09/24 1.727 m (5' 8\")     Wt Readings from Last 1 Encounters:   06/09/24 92.9 kg (204 lb 14.4 oz)     Current & Prior Antimicrobial Regimen(s):  Cefepime (6/10 - Current)  Vancomycin- Pharmacy to dose  Vancomycin 2250 mg (~25 mg/kg) IV x1  Will dose based on Vancomycin levels    Vancomycin Level(s) / Doses:    Date Time Dose Type of Level / Level Interpretation                 Note: Serum levels collected for AUC-based dosing may be high if collected in close proximity to the dose administered. This is not necessarily indicative of toxicity.    Cultures & Sensitivities:    Date Site Micro Susceptibility / Result   06/10 Blood x2 Sent            Recent Labs     06/09/24  2228   CREATININE 3.1*   BUN 51*   WBC 20.0*       Estimated Creatinine Clearance: 24 mL/min (A) (based on SCr of 3.1 mg/dL (H)).    Additional Lab Values / Findings of Note:    No results for input(s): \"PROCAL\" in the last 72 hours.

## 2024-06-10 NOTE — PROGRESS NOTES
[facility] - Clinical Pharmacy Note - Renal Dosing    Cefepime ordered for treatment of Sepsis. Per [facility] Renal Dose Adjustment Policy, Cefepime will be changed to 1000 mg Q12H EI (4 hour infusion).     Estimated Creatinine Clearance: Estimated Creatinine Clearance: 24 mL/min (A) (based on SCr of 3.1 mg/dL (H)).  Dialysis Status, STEW, CKD: STEW  BMI: Body mass index is 31.15 kg/m².    Rationale for Adjustment: Agent is renally eliminated and demonstrates time-dependent effect on bacterial eradication. Extended-infusion dosing strategy aims to enhance microbiologic and clinical efficacy.    Pharmacy will continue to monitor renal function, cultures and sensitivities (where available) and adjust dose as necessary.      Please call with any questions.      Marion Silverio, PharmD  [phone] (Main Pharmacy)

## 2024-06-10 NOTE — PLAN OF CARE
Informed Dr Noland regarding patients refusal for MRI, per MD she is allowed to refuse MRI, seen new CT scan order.

## 2024-06-10 NOTE — ED PROVIDER NOTES
[facility] EMERGENCY DEPARTMENT  eMERGENCY dEPARTMENT eNCOUnter      Pt Name: Barbi Cosby  MRN: [id number]  Birthdate 1965  Date of evaluation: 6/9/2024  Provider: Mann Larry MD  PCP: No primary care provider on file.      CHIEF COMPLAINT       Chief Complaint   Patient presents with    Back Pain    Neck Pain     C/o Mid to Upper Back Pain that is going into neck. Had \"ablation\"; burned the nerves per patient about a month ago to lower back. States has never had neck pain before. Takes BP meds at night and had a Percocet about an hour ago.        HISTORY OFPRESENT ILLNESS   (Location/Symptom, Timing/Onset, Context/Setting, Quality, Duration, Modifying Factors,Severity)  Note limiting factors.     Barbi Cosby is a 58 y.o. female presents with complaints of neck and upper back pain said that she had an ablation to treat her low back pain about a month ago she has never had neck pain before she says she took a 7.5 mg Percocet about an hour ago on arrival her blood pressures in the 70s she is not complaining of any chest pain or shortness of breath or headache or dizziness    Nursing Notes were all reviewed and agreed with or any disagreements were addressed  in the HPI.    REVIEW OF SYSTEMS    (2-9 systems for level 4, 10 or more for level 5)     Review of Systems    Positives and Pertinent negatives as per HPI.  Except as noted above in the ROS, all other systems were reviewed andnegative.       PASTMEDICAL HISTORY     Past Medical History:   Diagnosis Date    Degenerative disc disease, lumbar     Hyperlipidemia     Hypertension     Spinal stenosis          SURGICAL HISTORY     History reviewed. No pertinent surgical history.      CURRENT MEDICATIONS       Previous Medications    ALBUTEROL SULFATE HFA (VENTOLIN HFA) 108 (90 BASE) MCG/ACT INHALER    Inhale 2 puffs into the lungs 4 times daily as needed for Wheezing    AMLODIPINE (NORVASC) 10 MG TABLET    Take 1 tablet by mouth

## 2024-06-11 ENCOUNTER — APPOINTMENT (OUTPATIENT)
Dept: MRI IMAGING | Age: 59
DRG: 683 | End: 2024-06-11
Payer: COMMERCIAL

## 2024-06-11 ENCOUNTER — APPOINTMENT (OUTPATIENT)
Dept: ULTRASOUND IMAGING | Age: 59
DRG: 683 | End: 2024-06-11
Payer: COMMERCIAL

## 2024-06-11 ENCOUNTER — APPOINTMENT (OUTPATIENT)
Dept: GENERAL RADIOLOGY | Age: 59
DRG: 683 | End: 2024-06-11
Payer: COMMERCIAL

## 2024-06-11 VITALS
DIASTOLIC BLOOD PRESSURE: 90 MMHG | WEIGHT: 207.45 LBS | TEMPERATURE: 98 F | BODY MASS INDEX: 31.44 KG/M2 | SYSTOLIC BLOOD PRESSURE: 175 MMHG | HEIGHT: 68 IN | OXYGEN SATURATION: 96 % | HEART RATE: 80 BPM | RESPIRATION RATE: 21 BRPM

## 2024-06-11 LAB
ALBUMIN SERPL-MCNC: 3.8 G/DL (ref 3.4–5)
ANION GAP SERPL CALCULATED.3IONS-SCNC: 13 MMOL/L (ref 3–16)
BUN SERPL-MCNC: 20 MG/DL (ref 7–20)
CALCIUM SERPL-MCNC: 9.7 MG/DL (ref 8.3–10.6)
CHLORIDE SERPL-SCNC: 107 MMOL/L (ref 99–110)
CHOLEST SERPL-MCNC: 115 MG/DL (ref 0–199)
CO2 SERPL-SCNC: 25 MMOL/L (ref 21–32)
CREAT SERPL-MCNC: 0.8 MG/DL (ref 0.6–1.1)
DEPRECATED RDW RBC AUTO: 13.9 % (ref 12.4–15.4)
GFR SERPLBLD CREATININE-BSD FMLA CKD-EPI: 85 ML/MIN/{1.73_M2}
GLUCOSE SERPL-MCNC: 99 MG/DL (ref 70–99)
HCT VFR BLD AUTO: 47 % (ref 36–48)
HDLC SERPL-MCNC: 46 MG/DL (ref 40–60)
HGB BLD-MCNC: 16.1 G/DL (ref 12–16)
LDLC SERPL CALC-MCNC: 54 MG/DL
MAGNESIUM SERPL-MCNC: 2.1 MG/DL (ref 1.8–2.4)
MCH RBC QN AUTO: 33.3 PG (ref 26–34)
MCHC RBC AUTO-ENTMCNC: 34.3 G/DL (ref 31–36)
MCV RBC AUTO: 97.2 FL (ref 80–100)
PHOSPHATE SERPL-MCNC: 2.4 MG/DL (ref 2.5–4.9)
PLATELET # BLD AUTO: 133 K/UL (ref 135–450)
PMV BLD AUTO: 9.9 FL (ref 5–10.5)
POTASSIUM SERPL-SCNC: 3.7 MMOL/L (ref 3.5–5.1)
PROCALCITONIN SERPL IA-MCNC: 0.18 NG/ML (ref 0–0.15)
RBC # BLD AUTO: 4.83 M/UL (ref 4–5.2)
SODIUM SERPL-SCNC: 145 MMOL/L (ref 136–145)
TRIGL SERPL-MCNC: 76 MG/DL (ref 0–150)
VANCOMYCIN SERPL-MCNC: 8.7 UG/ML
VLDLC SERPL CALC-MCNC: 15 MG/DL
WBC # BLD AUTO: 12.7 K/UL (ref 4–11)

## 2024-06-11 PROCEDURE — 6370000000 HC RX 637 (ALT 250 FOR IP): Performed by: FAMILY MEDICINE

## 2024-06-11 PROCEDURE — APPNB180 APP NON BILLABLE TIME > 60 MINS: Performed by: NURSE PRACTITIONER

## 2024-06-11 PROCEDURE — 36415 COLL VENOUS BLD VENIPUNCTURE: CPT

## 2024-06-11 PROCEDURE — 80202 ASSAY OF VANCOMYCIN: CPT

## 2024-06-11 PROCEDURE — 72040 X-RAY EXAM NECK SPINE 2-3 VW: CPT

## 2024-06-11 PROCEDURE — 2580000003 HC RX 258: Performed by: FAMILY MEDICINE

## 2024-06-11 PROCEDURE — 6370000000 HC RX 637 (ALT 250 FOR IP): Performed by: NURSE PRACTITIONER

## 2024-06-11 PROCEDURE — 80061 LIPID PANEL: CPT

## 2024-06-11 PROCEDURE — 2580000003 HC RX 258: Performed by: INTERNAL MEDICINE

## 2024-06-11 PROCEDURE — 72120 X-RAY BEND ONLY L-S SPINE: CPT

## 2024-06-11 PROCEDURE — 84145 PROCALCITONIN (PCT): CPT

## 2024-06-11 PROCEDURE — 6360000002 HC RX W HCPCS: Performed by: FAMILY MEDICINE

## 2024-06-11 PROCEDURE — 83735 ASSAY OF MAGNESIUM: CPT

## 2024-06-11 PROCEDURE — 80069 RENAL FUNCTION PANEL: CPT

## 2024-06-11 PROCEDURE — 85027 COMPLETE CBC AUTOMATED: CPT

## 2024-06-11 PROCEDURE — 76770 US EXAM ABDO BACK WALL COMP: CPT

## 2024-06-11 RX ORDER — SODIUM CHLORIDE 9 MG/ML
INJECTION, SOLUTION INTRAVENOUS CONTINUOUS
Status: DISCONTINUED | OUTPATIENT
Start: 2024-06-11 | End: 2024-06-11

## 2024-06-11 RX ORDER — ASPIRIN 81 MG/1
81 TABLET, CHEWABLE ORAL DAILY
Qty: 30 TABLET | Refills: 3 | Status: SHIPPED | OUTPATIENT
Start: 2024-06-12

## 2024-06-11 RX ORDER — LABETALOL HYDROCHLORIDE 5 MG/ML
10 INJECTION, SOLUTION INTRAVENOUS EVERY 6 HOURS PRN
Status: DISCONTINUED | OUTPATIENT
Start: 2024-06-11 | End: 2024-06-11 | Stop reason: HOSPADM

## 2024-06-11 RX ORDER — METHOCARBAMOL 750 MG/1
750 TABLET, FILM COATED ORAL 4 TIMES DAILY
Qty: 28 TABLET | Refills: 0 | Status: SHIPPED | OUTPATIENT
Start: 2024-06-11 | End: 2024-06-18

## 2024-06-11 RX ORDER — METHOCARBAMOL 750 MG/1
750 TABLET, FILM COATED ORAL EVERY 6 HOURS SCHEDULED
Status: DISCONTINUED | OUTPATIENT
Start: 2024-06-11 | End: 2024-06-11 | Stop reason: HOSPADM

## 2024-06-11 RX ORDER — AMLODIPINE BESYLATE 10 MG/1
5 TABLET ORAL DAILY
Qty: 30 TABLET | Refills: 3 | Status: SHIPPED | OUTPATIENT
Start: 2024-06-11

## 2024-06-11 RX ADMIN — METHOCARBAMOL 750 MG: 750 TABLET ORAL at 13:15

## 2024-06-11 RX ADMIN — ASPIRIN 81 MG: 81 TABLET, CHEWABLE ORAL at 08:56

## 2024-06-11 RX ADMIN — SODIUM CHLORIDE, PRESERVATIVE FREE 10 ML: 5 INJECTION INTRAVENOUS at 08:56

## 2024-06-11 RX ADMIN — VANCOMYCIN HYDROCHLORIDE 1000 MG: 1 INJECTION, POWDER, LYOPHILIZED, FOR SOLUTION INTRAVENOUS at 09:34

## 2024-06-11 RX ADMIN — OXYCODONE HYDROCHLORIDE 15 MG: 15 TABLET ORAL at 04:10

## 2024-06-11 RX ADMIN — AMPICILLIN SODIUM 2000 MG: 2 INJECTION, POWDER, FOR SOLUTION INTRAMUSCULAR; INTRAVENOUS at 01:53

## 2024-06-11 RX ADMIN — ENOXAPARIN SODIUM 40 MG: 100 INJECTION SUBCUTANEOUS at 08:56

## 2024-06-11 RX ADMIN — CEFTRIAXONE SODIUM 2000 MG: 2 INJECTION, POWDER, FOR SOLUTION INTRAMUSCULAR; INTRAVENOUS at 06:32

## 2024-06-11 RX ADMIN — SODIUM CHLORIDE: 9 INJECTION, SOLUTION INTRAVENOUS at 08:55

## 2024-06-11 RX ADMIN — AMPICILLIN SODIUM 2000 MG: 2 INJECTION, POWDER, FOR SOLUTION INTRAMUSCULAR; INTRAVENOUS at 08:56

## 2024-06-11 ASSESSMENT — PAIN SCALES - GENERAL
PAINLEVEL_OUTOF10: 4
PAINLEVEL_OUTOF10: 1

## 2024-06-11 ASSESSMENT — PAIN - FUNCTIONAL ASSESSMENT: PAIN_FUNCTIONAL_ASSESSMENT: ACTIVITIES ARE NOT PREVENTED

## 2024-06-11 ASSESSMENT — PAIN DESCRIPTION - ONSET: ONSET: ON-GOING

## 2024-06-11 ASSESSMENT — PAIN DESCRIPTION - ORIENTATION: ORIENTATION: MID

## 2024-06-11 ASSESSMENT — PAIN DESCRIPTION - LOCATION: LOCATION: BACK

## 2024-06-11 ASSESSMENT — PAIN DESCRIPTION - DESCRIPTORS: DESCRIPTORS: ACHING;DISCOMFORT

## 2024-06-11 ASSESSMENT — PAIN DESCRIPTION - PAIN TYPE: TYPE: CHRONIC PAIN

## 2024-06-11 ASSESSMENT — PAIN DESCRIPTION - FREQUENCY: FREQUENCY: INTERMITTENT

## 2024-06-11 NOTE — CONSULTS
NEUROSURGERY CONSULT NOTE    SORAIDA MENDES [place]  [id number]   1965 6/11/2024    Requesting physician: Liban Noland MD    Reason for consultation:having thoracic and cervical pain s/p lumbar spinal ablation one month ago.    History of present illness: 58 y.o. female who presented to [facility] with SIRS.  PMHx significant for HLD, HTN and chronic back pain status post spinal ablation        States had lumbar spinal ablation performed about a month ago ever since pain has increased in the spine starting in the lumbar region rating up into her neck, states the neck pain is so bad it is difficult to move her head she walks with her head straight.  Denies numbness tingling in extremities denies weakness in the extremity.  Denies fevers chills abdominal pain nausea vomiting chest pain chest pressure     In the ER  Tachycardic in the 90s  White blood cell count 20 with neutrophils 15.9  Meet SIRS criteria     Sodium 135  Potassium 3.4  Gap 19  Glucose 123  Creatinine 3.1 BUN 51  Alk phos 139     Troponin 69, repeat 59     CT C-spine showed   No acute fracture./ Cervical spondylosis with moderate to severe C4-5 and moderate C5-6 spinal canal stenosis. There is also up to severe right C3-4, severe bilateral C4-5,  and severe right C5-6 neural foraminal narrowing. Patient stated she will only have a MRI if it is an open MRI. Dr. Abreu said she could do them as an outpatient and follow up.    ROS:   GENERAL:  Denies fever or recent illness. Denies weight changes   EYES:  Denies vision change or diplopia  EARS:  Denies hearing loss  CARDIAC:  Denies chest pain  RESPIRATORY:  Denies shortness of breath  SKIN:  Denies rash or lesions   HEM:  Denies excessive bruising  PSYCH:  Denies anxiety or depression  NEURO:  Denies headache, numbness or tingling or lateralizing weakness   :  Denies urinary difficulty  GI: Denies nausea, vomiting, diarrhea or constipation  MUSCULOSKELETAL:  No arthralgias    No Known  symmetric  VIII: Hearing intact bilaterally to spoken voice  IX: Palate movement equal bilaterally  XI: Shoulder shrug equal bilaterally  XII: Tongue midline    Musculoskeletal:   Gait: Not tested   Assist devices: None   Tone: normal  Motor strength:    Right  Left    Right  Left    Deltoid  5 5  Hip Flex  5 5   Biceps  5 5  Knee Extensors  5 5   Triceps  5 5  Knee Flexors  5 5   Wrist Ext  5 5  Ankle Dorsiflex.  5 5   Wrist Flex  5 5  Ankle Plantarflex.  5 5   Handgrip  5 5  Ext Erick Longus  5 5   Thumb Ext  5 5         Radiological Findings:  US RENAL COMPLETE    Result Date: 6/11/2024  1. No hydronephrosis. Electronically signed by Sammy Mae       Labs:  Recent Labs     06/11/24  0656   WBC 12.7*   HGB 16.1*   HCT 47.0   *       Recent Labs     06/11/24  0656      K 3.7      CO2 25   BUN 20   CREATININE 0.8   GLUCOSE 99   CALCIUM 9.7   PHOS 2.4*   MG 2.10       No results for input(s): \"PROTIME\", \"INR\", \"APTT\" in the last 72 hours.    Patient Active Problem List    Diagnosis Date Noted    SIRS (systemic inflammatory response syndrome) ([facility]) 06/10/2024       Assessment:  Patient is a 58 y.o. female w/neck pain after an ablation, no radicular pain. Probable musculoskeletal.     Plan:  No acute neurosurgical intervention indicated   Pain control: Managed by medical team  PT/OT consulted, appreciate recs  Xr flexion extension of cervical and lumbar  Robaxin started for spasms   MRI cervical and lumbar as outpatient. Patient can only do open MRI.  Follow up with Dr. Abreu  Thank you for consult. Will follow peripherally.  Please call with any questions or decline in neurological status    DISPO:  Dispo timing to be determined by primary team once patient is medically stable for discharge.    Patient was seen and examined with Dr. Abreu who agrees with above assessment and plan.     Electronically signed by: DEJA Guardado CNP, DEJA-CNP, 6/11/2024 10:46 AM  [phone]

## 2024-06-11 NOTE — PROGRESS NOTES
[facility]    [facility]              [phone]                      Barbi Cosby is a 58 y.o. female admitted with SIRS. We are following for STEW.    Interval History and plan:     Barbi Cosby is a 58 y.o. female with past medical hx of chronic back pain s/p ablation on chronic opioids, HTN, HLD who presented to [facility] EDwith mid to upper back pain that extended into her neck.     BP is better controlled .  Urine is not measured .  Creatinine improved 3.1 > 1.8 > 0.8    Plan:    Renal US is pending   Stop IVF  Stop NSAIDs  Avoid nephrotoxic meds  RFP daily  OK to DC home from renal perspective                    Assessment :     STEW  - Likely multifactorial from hypotension, prerenal, NSAIDs  -BUN to creatinine ratio suggest prerenal etiology  -No history of CKD  -No history of diabetes  -States she was on some water pill but has not taken in a couple days  -Additionally states that she has not been eating and drinking very well over the course the past week due to emotional depression  -Will check renal ultrasound and UA to rule out obstructive/infectious etiology  - Continue IV hydration  - Stop NSAIDs  - Blood pressure in better range now in the 110s systolic  - Avoid further nephrotoxic agents  - Avoid contrast if possible    Mild hypokalemia  Mild hyponatremia  Hypochloremia  - all improving status post repletion and hydration  - continue to monitor and replete as needed.        [facility] would like to thank Davis Olivares*   for opportunity to serve this patient      Enzo Moses MD    Please call with questions at-   24 Hrs Answering service [phone] or  7 am- 5 pm via Perfect serve or cell phone  SAMY Ortega          CC/reason for consult :     STEW     HPI :     Barbi Cosby is a 58 y.o. female with past medical hx of chronic back pain s/p ablation on chronic opioids, HTN, HLD who presented to [facility] ED yesterday  (PHENERGAN) tablet 12.5 mg, 12.5 mg, Oral, Q6H PRN **OR** ondansetron (ZOFRAN) injection 4 mg, 4 mg, IntraVENous, Q6H PRN  cefTRIAXone (ROCEPHIN) 2,000 mg in sterile water 20 mL IV syringe, 2,000 mg, IntraVENous, Q12H  vancomycin (VANCOCIN) intermittent dosing (placeholder), , Other, RX Placeholder  HYDROmorphone (DILAUDID) injection 1 mg, 1 mg, IntraVENous, Q4H PRN  oxyCODONE (OXY-IR) immediate release tablet 15 mg, 15 mg, Oral, Q6H PRN  ampicillin (OMNIPEN) 2,000 mg in sodium chloride 0.9 % 100 mL IVPB (mini-bag), 2,000 mg, IntraVENous, Q6H  enoxaparin (LOVENOX) injection 40 mg, 40 mg, SubCUTAneous, Daily       Vitals :     Vitals:    06/11/24 0510   BP:    Pulse:    Resp: 18   Temp:    SpO2:        I & O :       Intake/Output Summary (Last 24 hours) at 6/11/2024 0746  Last data filed at 6/10/2024 1842  Gross per 24 hour   Intake 360 ml   Output --   Net 360 ml          Physical Examination :     General appearance: Anxious- no, distressed- no, in good spirits- yes, Appearance- appears uncomfortable  HEENT: Lips- normal, teeth- ok , oral mucosa- moist  Neck : Mass- no, appears symmetrical, JVD- not visible  Respiratory: Respiratory effort-  normal, wheeze- no, crackles - none  Cardiovascular: Ausculation- No M/R/G, Edema trace to shins b/l  Abdomen: visible mass- no, distention- no, scar- no, tenderness- no                            hepatosplenomegaly-  no  Musculoskeletal:  clubbing no,cyanosis- no , digital ischemia- no                           muscle strength- grossly normal , tone - grossly normal  Skin: rashes- no , ulcers- no, induration- no, tightening - no  Psychiatric:  Judgement and insight- normal           AAO X 3     LABS:     Recent Labs     06/09/24 2228   WBC 20.0*   HGB 15.6   HCT 45.7          Recent Labs     06/09/24  2228 06/10/24  0856   * 142  142   K 3.4* 3.7  3.7   CL 93* 105  107   CO2 23 20*  20*   BUN 51* 40*  39*   CREATININE 3.1* 1.8*  1.8*   GLUCOSE 123* 91  90

## 2024-06-11 NOTE — DISCHARGE INSTRUCTIONS
-Please follow-up with neurosurgery to have open MRI performed in order to continue to assess your spinal pathologies.  - Resume your home blood pressure medications.  I decreased the amlodipine dosage in order to avoid hypotension.  - Please follow-up with your PCP and continue to try to eat as to avoid hypotension.  You would benefit from psychotherapy after being referred to a psychiatrist for your uncontrolled depression.  You should follow-up with your PCP with regards to this.  You might need to go back on antidepressants.  - If your symptoms fail to improve or worsen, please come back to the emergency department.  - I doubt that you have a infection so I will not discharged on antibiotics.

## 2024-06-11 NOTE — CONSULTS
NEUROSURGERY CONSULT NOTE    SORAIDA MENDES [place]  [id number]   1965 6/11/2024    Requesting physician: Liban Noland MD    Reason for consultation:having thoracic and cervical pain s/p lumbar spinal ablation one month ago.    History of present illness: 58 y.o. female who presented to [facility] with SIRS.  PMHx significant for HLD, HTN and chronic back pain status post spinal ablation        States had lumbar spinal ablation performed about a month ago ever since pain has increased in the spine starting in the lumbar region rating up into her neck, states the neck pain is so bad it is difficult to move her head she walks with her head straight.  Denies numbness tingling in extremities denies weakness in the extremity.  Denies fevers chills abdominal pain nausea vomiting chest pain chest pressure     In the ER  Tachycardic in the 90s  White blood cell count 20 with neutrophils 15.9  Meet SIRS criteria     Sodium 135  Potassium 3.4  Gap 19  Glucose 123  Creatinine 3.1 BUN 51  Alk phos 139     Troponin 69, repeat 59     CT C-spine showed   No acute fracture./ Cervical spondylosis with moderate to severe C4-5 and moderate C5-6 spinal canal stenosis. There is also up to severe right C3-4, severe bilateral C4-5,  and severe right C5-6 neural foraminal narrowing. Patient stated she will only have a MRI if it is an open MRI. Dr. Abreu said she could do them as an outpatient and follow up.    ROS:   GENERAL:  Denies fever or recent illness. Denies weight changes   EYES:  Denies vision change or diplopia  EARS:  Denies hearing loss  CARDIAC:  Denies chest pain  RESPIRATORY:  Denies shortness of breath  SKIN:  Denies rash or lesions   HEM:  Denies excessive bruising  PSYCH:  Denies anxiety or depression  NEURO:  Denies headache, numbness or tingling or lateralizing weakness   :  Denies urinary difficulty  GI: Denies nausea, vomiting, diarrhea or constipation  MUSCULOSKELETAL:  No arthralgias    No Known

## 2024-06-11 NOTE — DISCHARGE SUMMARY
V2.0  Discharge Summary    Name:  Barbi Cosby /Age/Sex: 1965 (58 y.o. female)   Admit Date: 2024  Discharge Date: 24    MRN & CSN:  [id number] & [id number] Encounter Date and Time 24 12:53 PM EDT    Attending:  Davis Olivares* Discharging Provider: Davis Chacko MD       Hospital Course:     58-year-old female presenting with neck and mid back pain admitted with hypotension, STEW suspected secondary to NSAID use versus prerenal factors in the setting of hypotension.  Was started on antibiotics for CNS infection, given IV fluids with recovery of renal function.  Her renal function did return to baseline her hypotension resolved.  Neurology services were consulted.  Patient also with elevated troponin levels presumed secondary to STEW.  EKG without ischemic changes neurosurgery services consulted and recommended MRI to further assess patient's known DDD, L5-S1 central disc herniation with severe subarticular impingement, moderate to severe facet arthropathy at L4-5 and L3-4, L4-5 moderate canal stenosis as evidenced by CT scan.  Unfortunately, patient does not tolerate closed MRI and would only undergo MRI if it is open.  Neurosurgery services to follow-up outpatient.  On 2024 patient was deemed fit for discharge.     Hypotension -resolved  - Suspect secondary to polypharmacy.  Patient's home amlodipine to be decreased to 5 mg p.o. daily.  Patient to continue metoprolol.  To follow-up outpatient with PCP.  also suspect component of depression leading to poor oral intake.  Should follow-up with PCP.    STEW -resolved  - Would benefit from a BMP 2 to 3 days after discharge.  To be followed by PCP.  Suspect secondary to polypharmacy in the setting of multiple NSAIDs plus or minus hypotension    Moderate to severe canal stenosis at L5-S1  Moderate to severe facet arthropathy L4-5 and L3-4  Moderate canal stenosis at L4-5  -To follow-up outpatient with PCP and

## 2024-06-11 NOTE — PROGRESS NOTES
Patient discharged home per MD order. She was given an AVS and educated on contents, she states understanding of education. Patient taken via wheelchair to awaiting friends car with friend driving car. Patient denies chest pain, SOB or dizziness at this time.

## 2024-06-11 NOTE — PROGRESS NOTES
[facility] -  Clinical Pharmacy Note    Vancomycin - Management by Pharmacy    Consult Date(s): 06/10/2024  Consulting Provider(s): Dr. Liban Noland    Assessment / Plan  Sepsis of CNS Origin - Vancomycin  Concurrent Antimicrobials:   Ampicillin  Ceftriaxone  Day of Vanc Therapy / Ordered Duration: #2  Current Dosing Method: Intermittent Dosing by Levels?Bayesian AUC dosing  Therapeutic Goal: Trough ~ 15 mg/L? -600 mg/L*h  Current Dose / Plan:   Admitted with STEW, which is resolving. SCr improved from 1.8?0.8 overnight  Baseline SCr ~1 (Feb 2024)  Received loading dose of 2250mg IV (~25 mg/kg) 6/10 AM.  Level today = 8.7 mg/L.  Given improvement in renal function, will schedule 1000mg IV q12h.  Regimen predicts steady-state AUC = 534 mg/L*h and trough = 16.4 mg/L  Level ordered for tomorrow AM, Wed 6/12 to confirm kinetic estimates.  Will continue to monitor clinical condition and make adjustments to regimen as appropriate.    Please call with questions--  Consuelo Pizano, PharmD, BCPS  Wireless: [phone]   6/11/2024 8:33 AM        Interval update:  STEW improving; SCr improved from 1.8?0.8. Afebrile since admission.     Subjective/Objective:   Barbi Cosby is a 58 y.o. female with a PMHx significant for HLD, HTN, recent lumbar spinal ablation who presented 6/9 with neck/back pain. Admitted with SIRS vs sepsis, STEW and neck/spinal pain.    Pharmacy is consulted to dose Vancomycin.    Ht Readings from Last 1 Encounters:   06/09/24 1.727 m (5' 8\")     Wt Readings from Last 1 Encounters:   06/11/24 94.1 kg (207 lb 7.3 oz)     Current & Prior Antimicrobial Regimen(s):  Ampicillin (6/10-current)  Ceftriaxone (6/10-current)  Vancomycin - Pharmacy to dose  Intermittent dosing based on levels (6/10-6/11)  1000mg IV q12h (6/11-current)    Vancomycin Level(s) / Doses:  Date Time Dose Type of Level / Level Interpretation   6/10 0505 2250mg IV x1 -- Loading dose - ~24 mg/kg  SCr improved from yesterday;

## 2024-06-11 NOTE — PROGRESS NOTES
[facility] - Clinical Pharmacy Note - Renal Dosing    Per [facility] Renal Dose Adjustment Policy, Ampicillin will be changed to 2000 mg IV EI q4h.     Estimated Creatinine Clearance: Estimated Creatinine Clearance: 92 mL/min (based on SCr of 0.8 mg/dL).  Dialysis Status, STEW, CKD: STEW, resolving  BMI: Body mass index is 31.54 kg/m².    Rationale for Adjustment: Agent is renally eliminated.    Pharmacy will continue to monitor renal function and adjust dose as necessary.      Please call with questions--  Consuelo Pizano PharmD, BCPS  Wireless: [phone]   6/11/2024 8:41 AM

## 2024-06-14 LAB
BACTERIA BLD CULT ORG #2: NORMAL
BACTERIA BLD CULT: NORMAL

## 2024-06-14 NOTE — PROGRESS NOTES
Physician Progress Note      PATIENT:               SORAIDA HASSAN  CSN #:                  [id number]  :                       1965  ADMIT DATE:       2024 9:48 PM  DISCH DATE:        2024 3:36 PM  RESPONDING  PROVIDER #:        Davis Schneider MD          QUERY TEXT:    Patient admitted with STEW and hypotension . Documentation reflects sirs vs   sepsis in H&P and progress notes on 6/10.  If possible, please document in the   progress notes and discharge summary if sepsis was:    The medical record reflects the following:  Risk Factors: STEW, hypotension, s/p ablation about a month ago, spinal   stenosis  Clinical Indicators: WBC 20, HR 90's.  H&P: SIRS versus sepsis  -Possible source from spinal ablation  -Broad-spectrum antibiotic to include CNS infection -vancomycin ampicillin   Rocephin  Creatinine 3.1 on admission  D/C summary: Was started on antibiotics for CNS infection, given IV fluids   with recovery of renal function.  Treatment: IVF, ABT, cultures, neurosurgery consult , Nephrology consult for   STEW  Options provided:  -- Sepsis due to CNS infection that is related to the prior ablation confirmed   after study  -- SIRS with STEW and sepsis is ruled out  -- Other - I will add my own diagnosis  -- Disagree - Not applicable / Not valid  -- Disagree - Clinically unable to determine / Unknown  -- Refer to Clinical Documentation Reviewer    PROVIDER RESPONSE TEXT:    This patient has SIRS with STEW and sepsis is ruled out    Query created by: Ángela Sommers on 2024 11:29 AM      Electronically signed by:  Davis Schneider MD 2024 3:08   PM

## 2024-10-23 ENCOUNTER — HOSPITAL ENCOUNTER (EMERGENCY)
Age: 59
Discharge: HOME | End: 2024-10-23
Attending: EMERGENCY MEDICINE
Payer: COMMERCIAL

## 2024-10-23 VITALS
BODY MASS INDEX: 29.54 KG/M2 | RESPIRATION RATE: 16 BRPM | HEIGHT: 68 IN | SYSTOLIC BLOOD PRESSURE: 130 MMHG | WEIGHT: 194.9 LBS | OXYGEN SATURATION: 93 % | TEMPERATURE: 97.9 F | DIASTOLIC BLOOD PRESSURE: 78 MMHG | HEART RATE: 88 BPM

## 2024-10-23 DIAGNOSIS — K02.9 DENTAL CARIES: Primary | ICD-10-CM

## 2024-10-23 PROCEDURE — 99283 EMERGENCY DEPT VISIT LOW MDM: CPT

## 2024-10-23 PROCEDURE — 6370000000 HC RX 637 (ALT 250 FOR IP): Performed by: EMERGENCY MEDICINE

## 2024-10-23 RX ORDER — CLINDAMYCIN HYDROCHLORIDE 300 MG/1
300 CAPSULE ORAL ONCE
Status: COMPLETED | OUTPATIENT
Start: 2024-10-23 | End: 2024-10-23

## 2024-10-23 RX ORDER — NAPROXEN 500 MG/1
500 TABLET ORAL 2 TIMES DAILY
Qty: 20 TABLET | Refills: 0 | Status: SHIPPED | OUTPATIENT
Start: 2024-10-23 | End: 2024-11-02

## 2024-10-23 RX ORDER — CLINDAMYCIN HYDROCHLORIDE 300 MG/1
300 CAPSULE ORAL 4 TIMES DAILY
Qty: 40 CAPSULE | Refills: 0 | Status: SHIPPED | OUTPATIENT
Start: 2024-10-23 | End: 2024-11-02

## 2024-10-23 RX ORDER — OXYCODONE AND ACETAMINOPHEN 5; 325 MG/1; MG/1
1 TABLET ORAL ONCE
Status: DISCONTINUED | OUTPATIENT
Start: 2024-10-23 | End: 2024-10-24 | Stop reason: HOSPADM

## 2024-10-23 RX ADMIN — CLINDAMYCIN HYDROCHLORIDE 300 MG: 300 CAPSULE ORAL at 22:42

## 2024-10-24 NOTE — ED PROVIDER NOTES
[facility] EMERGENCY DEPARTMENT  eMERGENCY dEPARTMENT eNCOUnter      Pt Name: Barbi Cosby  MRN: [id number]  Birthdate 1965  Date of evaluation: 10/23/2024  Provider: Mann Larry MD  PCP: No primary care provider on file.      CHIEF COMPLAINT       Dental pain    HISTORY OFPRESENT ILLNESS   (Location/Symptom, Timing/Onset, Context/Setting, Quality, Duration, Modifying Factors,Severity)  Note limiting factors.     Barbi Cosby is a 59 y.o. female was chewing some food and cracked a tooth in her left lower jawline now has pain in the mouth radiating to the ear and up the jaw denies any fever    Nursing Notes were all reviewed and agreed with or any disagreements were addressed  in the HPI.    REVIEW OF SYSTEMS    (2-9 systems for level 4, 10 or more for level 5)     Review of Systems    Positives and Pertinent negatives as per HPI.  Except as noted above in the ROS, all other systems were reviewed andnegative.       PASTMEDICAL HISTORY     Past Medical History:   Diagnosis Date    Degenerative disc disease, lumbar     Hyperlipidemia     Hypertension     Spinal stenosis          SURGICAL HISTORY     No past surgical history on file.      CURRENT MEDICATIONS       Previous Medications    ALBUTEROL SULFATE HFA (VENTOLIN HFA) 108 (90 BASE) MCG/ACT INHALER    Inhale 2 puffs into the lungs 4 times daily as needed for Wheezing    AMLODIPINE (NORVASC) 10 MG TABLET    Take 0.5 tablets by mouth daily    ASPIRIN 81 MG CHEWABLE TABLET    Take 1 tablet by mouth daily    ATORVASTATIN CALCIUM PO    Take by mouth    CAPSAICIN ([employer] CAPSAICIN HP) 0.1 % CREA    Apply 1 g topically every 4-6 hours as needed (leg pain)    CYCLOBENZAPRINE (FLEXERIL) 5 MG TABLET    Take 5 mg by mouth 3 times daily as needed for Muscle spasms    METOPROLOL SUCCINATE (TOPROL XL) 50 MG EXTENDED RELEASE TABLET    Take 1 tablet by mouth daily    VENLAFAXINE 150 MG EXTENDED RELEASE TABLET    Take 150 mg by mouth daily (with

## 2025-08-05 ENCOUNTER — HOSPITAL ENCOUNTER (OUTPATIENT)
Dept: GENERAL RADIOLOGY | Age: 60
Discharge: HOME | End: 2025-08-05

## 2025-08-05 DIAGNOSIS — M47.817 LUMBOSACRAL SPONDYLOSIS WITHOUT MYELOPATHY: ICD-10-CM

## 2025-08-05 PROCEDURE — 72110 X-RAY EXAM L-2 SPINE 4/>VWS: CPT
